# Patient Record
Sex: FEMALE | Race: WHITE | NOT HISPANIC OR LATINO | URBAN - METROPOLITAN AREA
[De-identification: names, ages, dates, MRNs, and addresses within clinical notes are randomized per-mention and may not be internally consistent; named-entity substitution may affect disease eponyms.]

---

## 2017-08-14 ENCOUNTER — EMERGENCY (EMERGENCY)
Facility: HOSPITAL | Age: 82
LOS: 0 days | Discharge: HOME | End: 2017-08-14
Attending: EMERGENCY MEDICINE | Admitting: EMERGENCY MEDICINE
Payer: MEDICARE

## 2017-08-14 VITALS
HEART RATE: 74 BPM | RESPIRATION RATE: 17 BRPM | DIASTOLIC BLOOD PRESSURE: 84 MMHG | OXYGEN SATURATION: 100 % | SYSTOLIC BLOOD PRESSURE: 195 MMHG

## 2017-08-14 VITALS
TEMPERATURE: 98 F | SYSTOLIC BLOOD PRESSURE: 194 MMHG | DIASTOLIC BLOOD PRESSURE: 82 MMHG | RESPIRATION RATE: 16 BRPM | OXYGEN SATURATION: 98 % | WEIGHT: 134.92 LBS | HEIGHT: 67 IN | HEART RATE: 76 BPM

## 2017-08-14 DIAGNOSIS — R04.0 EPISTAXIS: ICD-10-CM

## 2017-08-14 DIAGNOSIS — I48.91 UNSPECIFIED ATRIAL FIBRILLATION: ICD-10-CM

## 2017-08-14 DIAGNOSIS — G20 PARKINSON'S DISEASE: ICD-10-CM

## 2017-08-14 DIAGNOSIS — Z79.01 LONG TERM (CURRENT) USE OF ANTICOAGULANTS: ICD-10-CM

## 2017-08-14 LAB
BASOPHILS # BLD AUTO: 0.1 K/UL — SIGNIFICANT CHANGE UP (ref 0–0.2)
BASOPHILS NFR BLD AUTO: 0.9 % — SIGNIFICANT CHANGE UP (ref 0–2)
EOSINOPHIL # BLD AUTO: 0.1 K/UL — SIGNIFICANT CHANGE UP (ref 0–0.5)
EOSINOPHIL NFR BLD AUTO: 1.5 % — SIGNIFICANT CHANGE UP (ref 0–6)
HCT VFR BLD CALC: 37.2 % — SIGNIFICANT CHANGE UP (ref 34.5–45)
HGB BLD-MCNC: 13.3 G/DL — SIGNIFICANT CHANGE UP (ref 11.5–15.5)
INR BLD: 2.34 RATIO — HIGH (ref 0.88–1.16)
LYMPHOCYTES # BLD AUTO: 1.4 K/UL — SIGNIFICANT CHANGE UP (ref 1–3.3)
LYMPHOCYTES # BLD AUTO: 15.9 % — SIGNIFICANT CHANGE UP (ref 13–44)
MCHC RBC-ENTMCNC: 32.4 PG — SIGNIFICANT CHANGE UP (ref 27–34)
MCHC RBC-ENTMCNC: 35.8 GM/DL — SIGNIFICANT CHANGE UP (ref 32–36)
MCV RBC AUTO: 90.6 FL — SIGNIFICANT CHANGE UP (ref 80–100)
MONOCYTES # BLD AUTO: 0.5 K/UL — SIGNIFICANT CHANGE UP (ref 0–0.9)
MONOCYTES NFR BLD AUTO: 6.4 % — SIGNIFICANT CHANGE UP (ref 2–14)
NEUTROPHILS # BLD AUTO: 6.4 K/UL — SIGNIFICANT CHANGE UP (ref 1.8–7.4)
NEUTROPHILS NFR BLD AUTO: 75.3 % — SIGNIFICANT CHANGE UP (ref 43–77)
PLATELET # BLD AUTO: 226 K/UL — SIGNIFICANT CHANGE UP (ref 150–400)
PROTHROM AB SERPL-ACNC: 25.7 SEC — HIGH (ref 9.8–12.7)
RBC # BLD: 4.11 M/UL — SIGNIFICANT CHANGE UP (ref 3.8–5.2)
RBC # FLD: 12.2 % — SIGNIFICANT CHANGE UP (ref 10.3–14.5)
WBC # BLD: 8.5 K/UL — SIGNIFICANT CHANGE UP (ref 3.8–10.5)
WBC # FLD AUTO: 8.5 K/UL — SIGNIFICANT CHANGE UP (ref 3.8–10.5)

## 2017-08-14 PROCEDURE — 99284 EMERGENCY DEPT VISIT MOD MDM: CPT | Mod: 25

## 2017-08-14 PROCEDURE — 30901 CONTROL OF NOSEBLEED: CPT | Mod: RT

## 2017-08-14 NOTE — ED PROVIDER NOTE - OBJECTIVE STATEMENT
86 y/o female with history of parkinsons, atrial fibrillation on coumadin, in with nose bleed x 2 days, twice yesterday, was able to be stopped with pressure, today nose bleed worse on right side x 1 hour, denies any trauma, denies picking nose.   No other symptoms currently, denies hematuria, rectal bleeding.  Last INR a couple weeks ago 2.6.  No change in diet, no change in coumadin dosing.

## 2017-08-14 NOTE — ED ADULT NURSE NOTE - OBJECTIVE STATEMENT
presents to the ED with spontaneous nose bleed. states that it began last night and then stopped for a while and stated this evening again around 5pm. on coumadin at home.

## 2017-08-14 NOTE — ED PROCEDURE NOTE - CPROC ED NASAL DETAIL1
The bleeding stopped./The source of the bleeding was clearly identified./The area was cauterized with silver nitrate.

## 2017-08-14 NOTE — ED PROVIDER NOTE - PROGRESS NOTE DETAILS
attending note: I evaluated patient, agree with resident assessment and plan. No active bleeding 20 minutes post cautery, oropharynx clear.

## 2018-05-24 ENCOUNTER — INPATIENT (INPATIENT)
Facility: HOSPITAL | Age: 83
LOS: 4 days | Discharge: NURSING, REHAB OR LONG-TERM CARE | End: 2018-05-29
Attending: FAMILY MEDICINE | Admitting: FAMILY MEDICINE
Payer: MEDICARE

## 2018-05-24 VITALS
TEMPERATURE: 98 F | RESPIRATION RATE: 18 BRPM | OXYGEN SATURATION: 100 % | WEIGHT: 125 LBS | HEART RATE: 83 BPM | SYSTOLIC BLOOD PRESSURE: 222 MMHG | DIASTOLIC BLOOD PRESSURE: 102 MMHG

## 2018-05-24 LAB
ALBUMIN SERPL ELPH-MCNC: 3.7 G/DL — SIGNIFICANT CHANGE UP (ref 3.3–5)
ALP SERPL-CCNC: 93 U/L — SIGNIFICANT CHANGE UP (ref 40–120)
ALT FLD-CCNC: 9 U/L — LOW (ref 12–78)
ANION GAP SERPL CALC-SCNC: 7 MMOL/L — SIGNIFICANT CHANGE UP (ref 5–17)
APPEARANCE UR: CLEAR — SIGNIFICANT CHANGE UP
APTT BLD: 36 SEC — SIGNIFICANT CHANGE UP (ref 27.5–37.4)
AST SERPL-CCNC: 25 U/L — SIGNIFICANT CHANGE UP (ref 15–37)
BACTERIA # UR AUTO: (no result)
BASOPHILS # BLD AUTO: 0.08 K/UL — SIGNIFICANT CHANGE UP (ref 0–0.2)
BASOPHILS NFR BLD AUTO: 0.7 % — SIGNIFICANT CHANGE UP (ref 0–2)
BILIRUB SERPL-MCNC: 0.9 MG/DL — SIGNIFICANT CHANGE UP (ref 0.2–1.2)
BILIRUB UR-MCNC: NEGATIVE — SIGNIFICANT CHANGE UP
BLD GP AB SCN SERPL QL: SIGNIFICANT CHANGE UP
BUN SERPL-MCNC: 17 MG/DL — SIGNIFICANT CHANGE UP (ref 7–23)
CALCIUM SERPL-MCNC: 9.1 MG/DL — SIGNIFICANT CHANGE UP (ref 8.5–10.1)
CHLORIDE SERPL-SCNC: 99 MMOL/L — SIGNIFICANT CHANGE UP (ref 96–108)
CK SERPL-CCNC: 349 U/L — HIGH (ref 26–192)
CO2 SERPL-SCNC: 25 MMOL/L — SIGNIFICANT CHANGE UP (ref 22–31)
COLOR SPEC: YELLOW — SIGNIFICANT CHANGE UP
CREAT SERPL-MCNC: 0.95 MG/DL — SIGNIFICANT CHANGE UP (ref 0.5–1.3)
DIFF PNL FLD: (no result)
EOSINOPHIL # BLD AUTO: 0.08 K/UL — SIGNIFICANT CHANGE UP (ref 0–0.5)
EOSINOPHIL NFR BLD AUTO: 0.7 % — SIGNIFICANT CHANGE UP (ref 0–6)
EPI CELLS # UR: SIGNIFICANT CHANGE UP
GLUCOSE SERPL-MCNC: 101 MG/DL — HIGH (ref 70–99)
GLUCOSE UR QL: NEGATIVE MG/DL — SIGNIFICANT CHANGE UP
HCT VFR BLD CALC: 40.6 % — SIGNIFICANT CHANGE UP (ref 34.5–45)
HGB BLD-MCNC: 14.2 G/DL — SIGNIFICANT CHANGE UP (ref 11.5–15.5)
IMM GRANULOCYTES NFR BLD AUTO: 0.8 % — SIGNIFICANT CHANGE UP (ref 0–1.5)
INR BLD: 1.98 RATIO — HIGH (ref 0.88–1.16)
KETONES UR-MCNC: (no result)
LEUKOCYTE ESTERASE UR-ACNC: (no result)
LYMPHOCYTES # BLD AUTO: 1.05 K/UL — SIGNIFICANT CHANGE UP (ref 1–3.3)
LYMPHOCYTES # BLD AUTO: 9 % — LOW (ref 13–44)
MCHC RBC-ENTMCNC: 31.3 PG — SIGNIFICANT CHANGE UP (ref 27–34)
MCHC RBC-ENTMCNC: 35 GM/DL — SIGNIFICANT CHANGE UP (ref 32–36)
MCV RBC AUTO: 89.4 FL — SIGNIFICANT CHANGE UP (ref 80–100)
MONOCYTES # BLD AUTO: 0.8 K/UL — SIGNIFICANT CHANGE UP (ref 0–0.9)
MONOCYTES NFR BLD AUTO: 6.9 % — SIGNIFICANT CHANGE UP (ref 2–14)
NEUTROPHILS # BLD AUTO: 9.53 K/UL — HIGH (ref 1.8–7.4)
NEUTROPHILS NFR BLD AUTO: 81.9 % — HIGH (ref 43–77)
NITRITE UR-MCNC: NEGATIVE — SIGNIFICANT CHANGE UP
NRBC # BLD: 0 /100 WBCS — SIGNIFICANT CHANGE UP (ref 0–0)
PH UR: 6 — SIGNIFICANT CHANGE UP (ref 5–8)
PLATELET # BLD AUTO: 196 K/UL — SIGNIFICANT CHANGE UP (ref 150–400)
POTASSIUM SERPL-MCNC: 3.7 MMOL/L — SIGNIFICANT CHANGE UP (ref 3.5–5.3)
POTASSIUM SERPL-SCNC: 3.7 MMOL/L — SIGNIFICANT CHANGE UP (ref 3.5–5.3)
PROT SERPL-MCNC: 7.7 GM/DL — SIGNIFICANT CHANGE UP (ref 6–8.3)
PROT UR-MCNC: 15 MG/DL
PROTHROM AB SERPL-ACNC: 21.7 SEC — HIGH (ref 9.8–12.7)
RBC # BLD: 4.54 M/UL — SIGNIFICANT CHANGE UP (ref 3.8–5.2)
RBC # FLD: 13 % — SIGNIFICANT CHANGE UP (ref 10.3–14.5)
RBC CASTS # UR COMP ASSIST: (no result) /HPF (ref 0–4)
SODIUM SERPL-SCNC: 131 MMOL/L — LOW (ref 135–145)
SP GR SPEC: 1.01 — SIGNIFICANT CHANGE UP (ref 1.01–1.02)
TROPONIN I SERPL-MCNC: <0.015 NG/ML — SIGNIFICANT CHANGE UP (ref 0.01–0.04)
TYPE + AB SCN PNL BLD: SIGNIFICANT CHANGE UP
UROBILINOGEN FLD QL: NEGATIVE MG/DL — SIGNIFICANT CHANGE UP
WBC # BLD: 11.63 K/UL — HIGH (ref 3.8–10.5)
WBC # FLD AUTO: 11.63 K/UL — HIGH (ref 3.8–10.5)
WBC UR QL: (no result)

## 2018-05-24 PROCEDURE — 72125 CT NECK SPINE W/O DYE: CPT | Mod: 26

## 2018-05-24 PROCEDURE — 71250 CT THORAX DX C-: CPT | Mod: 26

## 2018-05-24 PROCEDURE — 93010 ELECTROCARDIOGRAM REPORT: CPT

## 2018-05-24 PROCEDURE — 74176 CT ABD & PELVIS W/O CONTRAST: CPT | Mod: 26

## 2018-05-24 PROCEDURE — 71045 X-RAY EXAM CHEST 1 VIEW: CPT | Mod: 26

## 2018-05-24 PROCEDURE — 73502 X-RAY EXAM HIP UNI 2-3 VIEWS: CPT | Mod: 26

## 2018-05-24 PROCEDURE — 99285 EMERGENCY DEPT VISIT HI MDM: CPT

## 2018-05-24 PROCEDURE — 70450 CT HEAD/BRAIN W/O DYE: CPT | Mod: 26

## 2018-05-24 RX ORDER — CEFTRIAXONE 500 MG/1
1000 INJECTION, POWDER, FOR SOLUTION INTRAMUSCULAR; INTRAVENOUS ONCE
Qty: 0 | Refills: 0 | Status: COMPLETED | OUTPATIENT
Start: 2018-05-24 | End: 2018-05-25

## 2018-05-24 RX ORDER — CARBIDOPA AND LEVODOPA 25; 100 MG/1; MG/1
0.5 TABLET ORAL
Qty: 0 | Refills: 0 | Status: DISCONTINUED | OUTPATIENT
Start: 2018-05-24 | End: 2018-05-29

## 2018-05-24 RX ORDER — AMANTADINE HCL 100 MG
100 CAPSULE ORAL
Qty: 0 | Refills: 0 | Status: DISCONTINUED | OUTPATIENT
Start: 2018-05-24 | End: 2018-05-29

## 2018-05-24 RX ORDER — ACETAMINOPHEN 500 MG
1000 TABLET ORAL ONCE
Qty: 0 | Refills: 0 | Status: COMPLETED | OUTPATIENT
Start: 2018-05-24 | End: 2018-05-24

## 2018-05-24 RX ORDER — CARBIDOPA AND LEVODOPA 25; 100 MG/1; MG/1
1 TABLET ORAL
Qty: 0 | Refills: 0 | COMMUNITY

## 2018-05-24 RX ADMIN — Medication 400 MILLIGRAM(S): at 21:00

## 2018-05-24 NOTE — ED PROVIDER NOTE - OBJECTIVE STATEMENT
86 y/o female with a PMHx of Parkinson's, and Afib on coumadin (levels last checked last week, 2.2) presents to the ED s/p fall down steps today. Pt states she was going down the stairs, but before she reached the bottom of the stairs she felt herself blacking out. Pt knew she was going to fall so she tried to brace herself on the railing. Fell and hit her head on door, landed on R hip. Due to pt's h/o Parkinson's, she states she feels dizzy and lightheaded at baseline and felt dizzy before falling. At time of eval, pt c/o pain to head and slight, receding pain to R hip. Pt's daughter at bedside says pt has a h/o syncopizing and falling, typically onto her bed. No other acute complaints at time of eval. 86 y/o female with a PMHx of Parkinson's, and Afib on coumadin (levels last checked last week, 2.2) presents to the ED s/p syncope and fall down steps today. Pt states she was going down the stairs, but before she reached the bottom of the stairs she felt herself blacking out. Pt knew she was going to fall so she tried to brace herself on the railing. Fell and hit her head on door, landed on R hip. Due to pt's h/o Parkinson's, she states she feels dizzy and lightheaded at baseline and felt dizzy before falling. At time of eval, pt c/o pain to head and slight, receding pain to R hip. Pt's daughter at bedside says pt has a h/o syncopizing and falling, typically onto her bed. No other acute complaints at time of eval. 88 y/o female with a PMHx of Parkinson's, and Afib on coumadin (levels last checked last week, 2.2) presents to the ED s/p syncope and fall down steps today. Pt states she was going down the stairs, but before she reached the bottom of the stairs she felt herself blacking out. Pt knew she was going to fall so she tried to brace herself on the railing. Fell and hit her head on door, landed on R hip. Due to pt's h/o Parkinson's, she states she feels dizzy and lightheaded at baseline and felt dizzy before falling. At time of eval, pt c/o pain to head and slight, pain to R hip. Pt's daughter at bedside says pt has a h/o syncopizing and falling, typically onto her bed. No other acute complaints at time of eval.

## 2018-05-24 NOTE — ED PROVIDER NOTE - MEDICAL DECISION MAKING DETAILS
Travis MORALES for ED attending, Dr. Reid: 88 y/o F s/p syncope with closed head injury. Plan for EKG, CXR, labs, x-ray pelvis, CT scans, admit.

## 2018-05-24 NOTE — ED PROVIDER NOTE - SKIN, MLM
Skin normal color for race, warm, dry. No evidence of rash. +Hematoma to R forehead. +Skin tear to web space of 1st and 2nd digit of left hand.

## 2018-05-24 NOTE — ED PROVIDER NOTE - PROGRESS NOTE DETAILS
Jeanette DO: Patient to be admitted s/p syncope; endorsed to hospitalist; found to have UTI- will treat; family at bedside- aware of all lab, CT scans results- agreeable with plan at this time.

## 2018-05-25 LAB
ANION GAP SERPL CALC-SCNC: 7 MMOL/L — SIGNIFICANT CHANGE UP (ref 5–17)
BASOPHILS # BLD AUTO: 0.05 K/UL — SIGNIFICANT CHANGE UP (ref 0–0.2)
BASOPHILS NFR BLD AUTO: 0.6 % — SIGNIFICANT CHANGE UP (ref 0–2)
BUN SERPL-MCNC: 16 MG/DL — SIGNIFICANT CHANGE UP (ref 7–23)
CALCIUM SERPL-MCNC: 8.8 MG/DL — SIGNIFICANT CHANGE UP (ref 8.5–10.1)
CHLORIDE SERPL-SCNC: 102 MMOL/L — SIGNIFICANT CHANGE UP (ref 96–108)
CO2 SERPL-SCNC: 25 MMOL/L — SIGNIFICANT CHANGE UP (ref 22–31)
CREAT SERPL-MCNC: 0.88 MG/DL — SIGNIFICANT CHANGE UP (ref 0.5–1.3)
EOSINOPHIL # BLD AUTO: 0.05 K/UL — SIGNIFICANT CHANGE UP (ref 0–0.5)
EOSINOPHIL NFR BLD AUTO: 0.6 % — SIGNIFICANT CHANGE UP (ref 0–6)
GLUCOSE SERPL-MCNC: 111 MG/DL — HIGH (ref 70–99)
HCT VFR BLD CALC: 38.1 % — SIGNIFICANT CHANGE UP (ref 34.5–45)
HGB BLD-MCNC: 13.4 G/DL — SIGNIFICANT CHANGE UP (ref 11.5–15.5)
IMM GRANULOCYTES NFR BLD AUTO: 0.5 % — SIGNIFICANT CHANGE UP (ref 0–1.5)
INR BLD: 2.27 RATIO — HIGH (ref 0.88–1.16)
LYMPHOCYTES # BLD AUTO: 0.98 K/UL — LOW (ref 1–3.3)
LYMPHOCYTES # BLD AUTO: 11.7 % — LOW (ref 13–44)
MCHC RBC-ENTMCNC: 31.5 PG — SIGNIFICANT CHANGE UP (ref 27–34)
MCHC RBC-ENTMCNC: 35.2 GM/DL — SIGNIFICANT CHANGE UP (ref 32–36)
MCV RBC AUTO: 89.6 FL — SIGNIFICANT CHANGE UP (ref 80–100)
MONOCYTES # BLD AUTO: 0.68 K/UL — SIGNIFICANT CHANGE UP (ref 0–0.9)
MONOCYTES NFR BLD AUTO: 8.1 % — SIGNIFICANT CHANGE UP (ref 2–14)
NEUTROPHILS # BLD AUTO: 6.57 K/UL — SIGNIFICANT CHANGE UP (ref 1.8–7.4)
NEUTROPHILS NFR BLD AUTO: 78.5 % — HIGH (ref 43–77)
NRBC # BLD: 0 /100 WBCS — SIGNIFICANT CHANGE UP (ref 0–0)
PLATELET # BLD AUTO: 183 K/UL — SIGNIFICANT CHANGE UP (ref 150–400)
POTASSIUM SERPL-MCNC: 3.2 MMOL/L — LOW (ref 3.5–5.3)
POTASSIUM SERPL-SCNC: 3.2 MMOL/L — LOW (ref 3.5–5.3)
PROTHROM AB SERPL-ACNC: 24.9 SEC — HIGH (ref 9.8–12.7)
RBC # BLD: 4.25 M/UL — SIGNIFICANT CHANGE UP (ref 3.8–5.2)
RBC # FLD: 13.1 % — SIGNIFICANT CHANGE UP (ref 10.3–14.5)
SODIUM SERPL-SCNC: 134 MMOL/L — LOW (ref 135–145)
TROPONIN I SERPL-MCNC: <0.015 NG/ML — SIGNIFICANT CHANGE UP (ref 0.01–0.04)
TROPONIN I SERPL-MCNC: <0.015 NG/ML — SIGNIFICANT CHANGE UP (ref 0.01–0.04)
WBC # BLD: 8.37 K/UL — SIGNIFICANT CHANGE UP (ref 3.8–10.5)
WBC # FLD AUTO: 8.37 K/UL — SIGNIFICANT CHANGE UP (ref 3.8–10.5)

## 2018-05-25 PROCEDURE — 93306 TTE W/DOPPLER COMPLETE: CPT | Mod: 26

## 2018-05-25 PROCEDURE — 93880 EXTRACRANIAL BILAT STUDY: CPT | Mod: 26

## 2018-05-25 RX ORDER — LEVOTHYROXINE SODIUM 125 MCG
100 TABLET ORAL DAILY
Qty: 0 | Refills: 0 | Status: DISCONTINUED | OUTPATIENT
Start: 2018-05-25 | End: 2018-05-29

## 2018-05-25 RX ORDER — LATANOPROST 0.05 MG/ML
1 SOLUTION/ DROPS OPHTHALMIC; TOPICAL
Qty: 0 | Refills: 0 | COMMUNITY

## 2018-05-25 RX ORDER — WARFARIN SODIUM 2.5 MG/1
1 TABLET ORAL DAILY
Qty: 0 | Refills: 0 | Status: DISCONTINUED | OUTPATIENT
Start: 2018-05-25 | End: 2018-05-26

## 2018-05-25 RX ORDER — CEFTRIAXONE 500 MG/1
1000 INJECTION, POWDER, FOR SOLUTION INTRAMUSCULAR; INTRAVENOUS EVERY 24 HOURS
Qty: 0 | Refills: 0 | Status: DISCONTINUED | OUTPATIENT
Start: 2018-05-25 | End: 2018-05-27

## 2018-05-25 RX ORDER — METOPROLOL TARTRATE 50 MG
0.5 TABLET ORAL
Qty: 0 | Refills: 0 | COMMUNITY

## 2018-05-25 RX ORDER — LATANOPROST 0.05 MG/ML
1 SOLUTION/ DROPS OPHTHALMIC; TOPICAL AT BEDTIME
Qty: 0 | Refills: 0 | Status: DISCONTINUED | OUTPATIENT
Start: 2018-05-25 | End: 2018-05-29

## 2018-05-25 RX ORDER — FLUOXETINE HCL 10 MG
20 CAPSULE ORAL DAILY
Qty: 0 | Refills: 0 | Status: DISCONTINUED | OUTPATIENT
Start: 2018-05-25 | End: 2018-05-29

## 2018-05-25 RX ORDER — LEVOTHYROXINE SODIUM 125 MCG
1 TABLET ORAL
Qty: 0 | Refills: 0 | COMMUNITY

## 2018-05-25 RX ORDER — CARBIDOPA AND LEVODOPA 25; 100 MG/1; MG/1
0.5 TABLET ORAL
Qty: 0 | Refills: 0 | COMMUNITY

## 2018-05-25 RX ORDER — RASAGILINE 0.5 MG/1
0.5 TABLET ORAL DAILY
Qty: 0 | Refills: 0 | Status: DISCONTINUED | OUTPATIENT
Start: 2018-05-25 | End: 2018-05-29

## 2018-05-25 RX ORDER — ACETAMINOPHEN 500 MG
650 TABLET ORAL EVERY 8 HOURS
Qty: 0 | Refills: 0 | Status: DISCONTINUED | OUTPATIENT
Start: 2018-05-25 | End: 2018-05-29

## 2018-05-25 RX ORDER — FLUOXETINE HCL 10 MG
1 CAPSULE ORAL
Qty: 0 | Refills: 0 | COMMUNITY

## 2018-05-25 RX ORDER — ATORVASTATIN CALCIUM 80 MG/1
1 TABLET, FILM COATED ORAL
Qty: 0 | Refills: 0 | COMMUNITY

## 2018-05-25 RX ORDER — AMANTADINE HCL 100 MG
1 CAPSULE ORAL
Qty: 0 | Refills: 0 | COMMUNITY

## 2018-05-25 RX ORDER — METOPROLOL TARTRATE 50 MG
12.5 TABLET ORAL AT BEDTIME
Qty: 0 | Refills: 0 | Status: DISCONTINUED | OUTPATIENT
Start: 2018-05-25 | End: 2018-05-29

## 2018-05-25 RX ORDER — RASAGILINE 0.5 MG/1
1 TABLET ORAL
Qty: 0 | Refills: 0 | COMMUNITY

## 2018-05-25 RX ORDER — ATORVASTATIN CALCIUM 80 MG/1
20 TABLET, FILM COATED ORAL AT BEDTIME
Qty: 0 | Refills: 0 | Status: DISCONTINUED | OUTPATIENT
Start: 2018-05-25 | End: 2018-05-29

## 2018-05-25 RX ADMIN — Medication 20 MILLIGRAM(S): at 09:08

## 2018-05-25 RX ADMIN — RASAGILINE 0.5 MILLIGRAM(S): 0.5 TABLET ORAL at 14:37

## 2018-05-25 RX ADMIN — CEFTRIAXONE 1000 MILLIGRAM(S): 500 INJECTION, POWDER, FOR SOLUTION INTRAMUSCULAR; INTRAVENOUS at 12:50

## 2018-05-25 RX ADMIN — CARBIDOPA AND LEVODOPA 0.5 TABLET(S): 25; 100 TABLET ORAL at 12:50

## 2018-05-25 RX ADMIN — CARBIDOPA AND LEVODOPA 0.5 TABLET(S): 25; 100 TABLET ORAL at 09:08

## 2018-05-25 RX ADMIN — Medication 12.5 MILLIGRAM(S): at 21:40

## 2018-05-25 RX ADMIN — Medication 100 MICROGRAM(S): at 04:33

## 2018-05-25 RX ADMIN — Medication 100 MILLIGRAM(S): at 09:08

## 2018-05-25 RX ADMIN — LATANOPROST 1 DROP(S): 0.05 SOLUTION/ DROPS OPHTHALMIC; TOPICAL at 21:41

## 2018-05-25 RX ADMIN — CEFTRIAXONE 1000 MILLIGRAM(S): 500 INJECTION, POWDER, FOR SOLUTION INTRAMUSCULAR; INTRAVENOUS at 03:08

## 2018-05-25 RX ADMIN — ATORVASTATIN CALCIUM 20 MILLIGRAM(S): 80 TABLET, FILM COATED ORAL at 21:40

## 2018-05-25 RX ADMIN — CARBIDOPA AND LEVODOPA 0.5 TABLET(S): 25; 100 TABLET ORAL at 17:11

## 2018-05-25 RX ADMIN — Medication 100 MILLIGRAM(S): at 12:50

## 2018-05-25 RX ADMIN — WARFARIN SODIUM 1 MILLIGRAM(S): 2.5 TABLET ORAL at 21:41

## 2018-05-25 NOTE — PHYSICAL THERAPY INITIAL EVALUATION ADULT - LIVES WITH, PROFILE
children/lives w/, daughter and niece in 2 story home, 3 YOMI w/rail, able to stay on ground floor/other relative/spouse

## 2018-05-25 NOTE — PHYSICAL THERAPY INITIAL EVALUATION ADULT - PERTINENT HX OF CURRENT PROBLEM, REHAB EVAL
Pt c/o of syncope and fall, when going down the stairs, but before she reached the bottom of the stairs she felt herself blacking out. Pt knew she was going to fall so she tried to brace herself on the railing.  CT c-spine (+) Mod reversal to the normal cervical lordosis, mod/severe spondylosis. CTH (+) large R frontal scalp hematoma.XRC (+) pleural thickening w/ calcification Pt c/o of syncope and fall. Before she reached the bottom of the stairs she felt herself blacking out. Pt anticipated the fall, tried to brace herself on railing, hit head on door, landed on R hip. Dizziness at baseline. CT c-spine (+) Mod reversal to the normal cervical lordosis, mod/severe spondylosis. CTH (+) large R frontal scalp hematoma.XRC (+) pleural thickening w/ calcification.XRH (-). Pt c/o of syncope and fall. At home, before she reached the bottom of the stairs she felt herself blacking out. Pt anticipated the fall, tried to brace herself on railing, hit head on door, landed on R hip. Dizziness at baseline. CT c-spine (+) Mod reversal to the normal cervical lordosis, mod/severe spondylosis. CTH (+) large R frontal scalp hematoma. CXR (+) pleural thickening w/ calcification. XR hip (-).

## 2018-05-25 NOTE — PHYSICAL THERAPY INITIAL EVALUATION ADULT - GENERAL OBSERVATIONS, REHAB EVAL
Pt was received on CICU, alert and in NAD. Pt was received sitting in BS chair in CICU, alert and in NAD.

## 2018-05-25 NOTE — PHYSICAL THERAPY INITIAL EVALUATION ADULT - ADDITIONAL COMMENTS
Pt states she was using a RW to amb around the house but then stopped using it because she felt more steady. Pt needs assistance w/meals and groceries.

## 2018-05-25 NOTE — H&P ADULT - HISTORY OF PRESENT ILLNESS
88 y/o female with a PMHx of Parkinson's, and Afib on coumadin (levels last checked last week, 2.2) presents to the ED s/p syncope and fall down steps today. Pt states she was going down the stairs, but before she reached the bottom of the stairs she felt herself blacking out. Pt knew she was going to fall so she tried to brace herself on the railing. Fell and hit her head on door, landed on R hip. Due to pt's h/o Parkinson's, she states she feels dizzy and lightheaded at baseline and felt dizzy before falling. At time of eval, pt c/o pain to head and slight, pain to R hip. No other acute complaints at time of eval.

## 2018-05-25 NOTE — H&P ADULT - NSHPPHYSICALEXAM_GEN_ALL_CORE
Vital Signs Last 24 Hrs  T(C): 36.7 (24 May 2018 18:51), Max: 36.7 (24 May 2018 18:51)  T(F): 98 (24 May 2018 18:51), Max: 98 (24 May 2018 18:51)  HR: 80 (24 May 2018 21:30) (80 - 83)  BP: 177/76 (24 May 2018 21:30) (177/76 - 222/102)  RR: 20 (24 May 2018 21:30) (18 - 20)  SpO2: 100% (24 May 2018 21:30) (100% - 100%)

## 2018-05-25 NOTE — PHYSICAL THERAPY INITIAL EVALUATION ADULT - LEVEL OF INDEPENDENCE: SIT/STAND, REHAB EVAL
contact guard/minimum assist (75% patients effort)/from toilet min assist, from chair CG contact guard/minimum assist (75% patients effort)/from toilet w/ min assist, from chair CG

## 2018-05-25 NOTE — H&P ADULT - ASSESSMENT
88 yo female presented after syncope.    A/P:    1.  Syncope  s/p Fall  Scalp hematoma  -will follow clinically in telemetry  -follow cardiology consult  -will follow PT eval    2.  Parkinson's disease   -continue Home medications    3.  HTN  -follow BP and adjust medication as needed    4.   Hypothyroidism  -will continue levothyroxine     5.  UTI  -started on ceftriaxone  -follow urine cx

## 2018-05-25 NOTE — H&P ADULT - NEUROLOGICAL DETAILS
normal strength/sensation intact/deep reflexes intact/cranial nerves intact/alert and oriented x 3/responds to pain

## 2018-05-26 LAB
BASOPHILS # BLD AUTO: 0.08 K/UL — SIGNIFICANT CHANGE UP (ref 0–0.2)
BASOPHILS NFR BLD AUTO: 1.1 % — SIGNIFICANT CHANGE UP (ref 0–2)
CULTURE RESULTS: NO GROWTH — SIGNIFICANT CHANGE UP
EOSINOPHIL # BLD AUTO: 0.13 K/UL — SIGNIFICANT CHANGE UP (ref 0–0.5)
EOSINOPHIL NFR BLD AUTO: 1.7 % — SIGNIFICANT CHANGE UP (ref 0–6)
GLUCOSE BLDC GLUCOMTR-MCNC: 100 MG/DL — HIGH (ref 70–99)
HCT VFR BLD CALC: 38.1 % — SIGNIFICANT CHANGE UP (ref 34.5–45)
HGB BLD-MCNC: 13.1 G/DL — SIGNIFICANT CHANGE UP (ref 11.5–15.5)
IMM GRANULOCYTES NFR BLD AUTO: 0.3 % — SIGNIFICANT CHANGE UP (ref 0–1.5)
INR BLD: 1.69 RATIO — HIGH (ref 0.88–1.16)
INR BLD: 1.74 RATIO — HIGH (ref 0.88–1.16)
LYMPHOCYTES # BLD AUTO: 1.24 K/UL — SIGNIFICANT CHANGE UP (ref 1–3.3)
LYMPHOCYTES # BLD AUTO: 16.5 % — SIGNIFICANT CHANGE UP (ref 13–44)
MCHC RBC-ENTMCNC: 31.2 PG — SIGNIFICANT CHANGE UP (ref 27–34)
MCHC RBC-ENTMCNC: 34.4 GM/DL — SIGNIFICANT CHANGE UP (ref 32–36)
MCV RBC AUTO: 90.7 FL — SIGNIFICANT CHANGE UP (ref 80–100)
MONOCYTES # BLD AUTO: 0.6 K/UL — SIGNIFICANT CHANGE UP (ref 0–0.9)
MONOCYTES NFR BLD AUTO: 8 % — SIGNIFICANT CHANGE UP (ref 2–14)
NEUTROPHILS # BLD AUTO: 5.46 K/UL — SIGNIFICANT CHANGE UP (ref 1.8–7.4)
NEUTROPHILS NFR BLD AUTO: 72.4 % — SIGNIFICANT CHANGE UP (ref 43–77)
NRBC # BLD: 0 /100 WBCS — SIGNIFICANT CHANGE UP (ref 0–0)
PLATELET # BLD AUTO: 197 K/UL — SIGNIFICANT CHANGE UP (ref 150–400)
PROTHROM AB SERPL-ACNC: 18.4 SEC — HIGH (ref 9.8–12.7)
PROTHROM AB SERPL-ACNC: 19 SEC — HIGH (ref 9.8–12.7)
RBC # BLD: 4.2 M/UL — SIGNIFICANT CHANGE UP (ref 3.8–5.2)
RBC # FLD: 13.2 % — SIGNIFICANT CHANGE UP (ref 10.3–14.5)
SPECIMEN SOURCE: SIGNIFICANT CHANGE UP
WBC # BLD: 7.53 K/UL — SIGNIFICANT CHANGE UP (ref 3.8–10.5)
WBC # FLD AUTO: 7.53 K/UL — SIGNIFICANT CHANGE UP (ref 3.8–10.5)

## 2018-05-26 PROCEDURE — 70496 CT ANGIOGRAPHY HEAD: CPT | Mod: 26

## 2018-05-26 PROCEDURE — 70450 CT HEAD/BRAIN W/O DYE: CPT | Mod: 26,59

## 2018-05-26 RX ORDER — PROTHROMBIN COMPLEX CONCENTRATE (HUMAN) 25.5; 16.5; 24; 22; 22; 26 [IU]/ML; [IU]/ML; [IU]/ML; [IU]/ML; [IU]/ML; [IU]/ML
1500 POWDER, FOR SOLUTION INTRAVENOUS ONCE
Qty: 0 | Refills: 0 | Status: COMPLETED | OUTPATIENT
Start: 2018-05-26 | End: 2018-05-26

## 2018-05-26 RX ADMIN — PROTHROMBIN COMPLEX CONCENTRATE (HUMAN) 400 INTERNATIONAL UNIT(S): 25.5; 16.5; 24; 22; 22; 26 POWDER, FOR SOLUTION INTRAVENOUS at 12:17

## 2018-05-26 RX ADMIN — CARBIDOPA AND LEVODOPA 0.5 TABLET(S): 25; 100 TABLET ORAL at 10:03

## 2018-05-26 RX ADMIN — Medication 100 MICROGRAM(S): at 05:53

## 2018-05-26 RX ADMIN — Medication 100 MILLIGRAM(S): at 14:12

## 2018-05-26 RX ADMIN — CEFTRIAXONE 1000 MILLIGRAM(S): 500 INJECTION, POWDER, FOR SOLUTION INTRAMUSCULAR; INTRAVENOUS at 14:16

## 2018-05-26 RX ADMIN — LATANOPROST 1 DROP(S): 0.05 SOLUTION/ DROPS OPHTHALMIC; TOPICAL at 21:35

## 2018-05-26 RX ADMIN — Medication 12.5 MILLIGRAM(S): at 21:35

## 2018-05-26 RX ADMIN — Medication 100 MILLIGRAM(S): at 10:03

## 2018-05-26 RX ADMIN — CARBIDOPA AND LEVODOPA 0.5 TABLET(S): 25; 100 TABLET ORAL at 14:12

## 2018-05-26 RX ADMIN — ATORVASTATIN CALCIUM 20 MILLIGRAM(S): 80 TABLET, FILM COATED ORAL at 21:35

## 2018-05-26 RX ADMIN — RASAGILINE 0.5 MILLIGRAM(S): 0.5 TABLET ORAL at 14:13

## 2018-05-26 RX ADMIN — Medication 20 MILLIGRAM(S): at 14:13

## 2018-05-26 RX ADMIN — CARBIDOPA AND LEVODOPA 0.5 TABLET(S): 25; 100 TABLET ORAL at 18:12

## 2018-05-26 NOTE — PROGRESS NOTE ADULT - SUBJECTIVE AND OBJECTIVE BOX
CHIEF COMPLAINT: Patient is a 87y old  Female who presents with a chief complaint of syncope and fall (25 May 2018 02:00)      HPI:  88 y/o female with a PMHx of Parkinson's, and Afib on coumadin (levels last checked last week, 2.2) presents to the ED s/p syncope and fall down steps today. Pt states she was going down the stairs, but before she reached the bottom of the stairs she felt herself blacking out. Pt knew she was going to fall so she tried to brace herself on the railing. Fell and hit her head on door, landed on R hip. Due to pt's h/o Parkinson's, she states she feels dizzy and lightheaded at baseline and felt dizzy before falling. At time of eval, pt c/o pain to head and slight, pain to R hip. No other acute complaints at time of eval. (25 May 2018 02:00)      5/26- code stroke called this AM for confusion- finding of ICH        PMHx: PAST MEDICAL & SURGICAL HISTORY:  Parkinson's disease  Afib  No significant past surgical history        Allergies: Allergies    amoxicillin (Unknown)  contrast (Unknown)    Intolerances          REVIEW OF SYSTEMS:    CONSTITUTIONAL: No weakness, fevers or chills  EYES/ENT: No visual changes;  No vertigo or throat pain   NECK: No pain or stiffness  RESPIRATORY: No cough, wheezing, hemoptysis; No shortness of breath  CARDIOVASCULAR: No chest pain or palpitations  GASTROINTESTINAL: No abdominal or epigastric pain. No nausea, vomiting, or hematemesis; No diarrhea or constipation. No melena or hematochezia.  GENITOURINARY: No dysuria, frequency or hematuria  NEUROLOGICAL: No numbness or weakness  SKIN: No itching, burning, rashes, or lesions   All other review of systems is negative unless indicated above    ICU Vital Signs Last 24 Hrs  T(C): 36.4 (26 May 2018 05:40), Max: 36.7 (25 May 2018 23:22)  T(F): 97.6 (26 May 2018 05:40), Max: 98 (25 May 2018 23:22)  HR: 76 (26 May 2018 08:00) (69 - 101)  BP: 161/90 (26 May 2018 08:00) (106/47 - 188/61)  BP(mean): 108 (26 May 2018 08:00) (62 - 119)  ABP: --  ABP(mean): --  RR: 17 (26 May 2018 08:00) (13 - 30)  SpO2: 96% (26 May 2018 08:00) (93% - 99%)          PHYSICAL EXAM:   Constitutional: NAD, awake and alert,  right periorbital ecchymosis  HEENT: PERR, EOMI, Normal Hearing, MMM  Neck: Soft and supple, No LAD, No JVD  Respiratory: Breath sounds are clear bilaterally, No wheezing, rales or rhonchi  Cardiovascular: S1 and S2, regular rate and rhythm, no Murmurs, gallops or rubs  Gastrointestinal: Bowel Sounds present, soft, nontender, nondistended, no guarding, no rebound  Extremities: No peripheral edema  Vascular: 2+ peripheral pulses  Neurological: A/O x 3, no focal deficits  Musculoskeletal: 5/5 strength b/l upper and lower extremities  Skin: No rashes    MEDICATIONS  (STANDING):  amantadine 100 milliGRAM(s) Oral <User Schedule>  atorvastatin 20 milliGRAM(s) Oral at bedtime  carbidopa/levodopa  25/100 0.5 Tablet(s) Oral <User Schedule>  cefTRIAXone Injectable. 1000 milliGRAM(s) IV Push every 24 hours  FLUoxetine 20 milliGRAM(s) Oral daily  latanoprost 0.005% Ophthalmic Solution 1 Drop(s) Both EYES at bedtime  levothyroxine 100 MICROGram(s) Oral daily  metoprolol tartrate 12.5 milliGRAM(s) Oral at bedtime  prothrombin complex concentrate IVPB (KCENTRA) 1500 International Unit(s) IV Intermittent once  rasagiline Tablet 0.5 milliGRAM(s) Oral daily      LABS: All Labs Reviewed:                                13.4   8.37  )-----------( 183      ( 25 May 2018 05:10 )             38.1   05-25    134<L>  |  102  |  16  ----------------------------<  111<H>  3.2<L>   |  25  |  0.88    Ca    8.8      25 May 2018 05:10    TPro  7.7  /  Alb  3.7  /  TBili  0.9  /  DBili  x   /  AST  25  /  ALT  9<L>  /  AlkPhos  93  05-24  PT/INR - ( 26 May 2018 05:19 )   PT: 19.0 sec;   INR: 1.74 ratio         PTT - ( 24 May 2018 20:09 )  PTT:36.0 sec     PTT - ( 24 May 2018 20:09 )  PTT:36.0 sec  CARDIAC MARKERS ( 25 May 2018 05:10 )  <0.015 ng/mL / x     / x     / x     / x      CARDIAC MARKERS ( 25 May 2018 02:31 )  <0.015 ng/mL / x     / x     / x     / x      CARDIAC MARKERS ( 24 May 2018 20:09 )  <0.015 ng/mL / x     / 349 U/L / x     / x          RADIOLOGY: CT head < from: CT Head No Cont (05.24.18 @ 19:33) >  IMPRESSION: Moderate to large right frontal scalp hematoma.  Moderate   chronic microvascular changes without evidence of an acute transcortical   infarction or hemorrhage. MR is a more sensitive imaging modality for the   evaluation of an acute infarction.     EMILIA TSAI   This document has been electronically signed. May 24 2018  7:37PM    < end of copied text >      EKG: SR, RBBB inferior and lateral T wave changes     Telemetry:  SR    ECHO: < from: Transthoracic Echocardiogram (05.25.18 @ 08:37) >   Summary     The left ventricle is normal in size, wall motion and contractility.   Mild concentric left ventricular hypertrophy is present.   Estimated left ventricular ejection fraction is 60-65 %.   Normal aortic valve structure and function.   Trace aortic regurgitation is present.   Normal appearing mitral valve structure and function.   EA reversal of the mitral inflow consistent with reduced compliance of   the   left ventricle.   Mild (1+) mitral regurgitation is present.   Normal appearing tricuspid valve structure and function.   Mild (1+) tricuspid valve regurgitation is present.   Mild pulmonary hypertension.     Signature    < end of copied text >

## 2018-05-26 NOTE — PROGRESS NOTE ADULT - SUBJECTIVE AND OBJECTIVE BOX
86 y/o female with a PMHx of Parkinson's, and Afib on coumadin (levels last checked last week, 2.2) presents to the ED s/p syncope and fall down steps today. Pt states she was going down the stairs, but before she reached the bottom of the stairs she felt herself blacking out. Pt knew she was going to fall so she tried to brace herself on the railing. Fell and hit her head on door, landed on R hip. Due to pt's h/o Parkinson's, she states she feels dizzy and lightheaded at baseline and felt dizzy before falling. At time of eval, pt c/o pain to head and slight, pain to R hip. No other acute complaints at time of eval.      18: Patient seen and examined. S/P code stroke this morning. She had repeat CT head: small SAH. Discussed with 2 daughters at bed side regarding management plan.       Vital Signs Last 24 Hrs  T(C): 36.4 (26 May 2018 05:40), Max: 36.7 (25 May 2018 23:22)  T(F): 97.6 (26 May 2018 05:40), Max: 98 (25 May 2018 23:22)  HR: 76 (26 May 2018 08:00) (69 - 101)  BP: 161/90 (26 May 2018 08:00) (125/64 - 188/61)  BP(mean): 108 (26 May 2018 08:00) (76 - 119)  RR: 17 (26 May 2018 08:00) (13 - 30)  SpO2: 96% (26 May 2018 08:00) (93% - 99%).      Physical exam:       · CARDIAC: Normal rate, regular rhythm.  Heart sounds S1, S2.  No murmurs, rubs or gallops.	  · RESPIRATORY: Breath sounds clear and equal bilaterally.	  · GASTROINTESTINAL: Abdomen soft, non-tender, no guarding.	  · NEUROLOGICAL: - - -	  · Level of Consciousness: alert	  · Speech: clear	  · SKIN: Skin normal color for race, warm, dry and intact. No evidence of rash.	              Labs:                           13.1   7.53  )-----------( 197      ( 26 May 2018 09:32 )             38.1     25 May 2018 05:10    134    |  102    |  16     ----------------------------<  111    3.2     |  25     |  0.88     Ca    8.8        25 May 2018 05:10    TPro  7.7    /  Alb  3.7    /  TBili  0.9    /  DBili  x      /  AST  25     /  ALT  9      /  AlkPhos  93     24 May 2018 20:09    LIVER FUNCTIONS - ( 24 May 2018 20:09 )  Alb: 3.7 g/dL / Pro: 7.7 gm/dL / ALK PHOS: 93 U/L / ALT: 9 U/L / AST: 25 U/L / GGT: x           PT/INR - ( 26 May 2018 09:32 )   PT: 18.4 sec;   INR: 1.69 ratio         PTT - ( 24 May 2018 20:09 )  PTT:36.0 sec  CAPILLARY BLOOD GLUCOSE      POCT Blood Glucose.: 100 mg/dL (26 May 2018 08:08)    CARDIAC MARKERS ( 25 May 2018 05:10 )  <0.015 ng/mL / x     / x     / x     / x      CARDIAC MARKERS ( 25 May 2018 02:31 )  <0.015 ng/mL / x     / x     / x     / x      CARDIAC MARKERS ( 24 May 2018 20:09 )  <0.015 ng/mL / x     / 349 U/L / x     / x          Urinalysis Basic - ( 24 May 2018 20:09 )    Color: Yellow / Appearance: Clear / S.015 / pH: x  Gluc: x / Ketone: Trace  / Bili: Negative / Urobili: Negative mg/dL   Blood: x / Protein: 15 mg/dL / Nitrite: Negative   Leuk Esterase: Moderate / RBC: 3-5 /HPF / WBC 6-10   Sq Epi: x / Non Sq Epi: Few / Bacteria: Moderate          MEDICATIONS:    amantadine 100 milliGRAM(s) Oral <User Schedule>  atorvastatin 20 milliGRAM(s) Oral at bedtime  carbidopa/levodopa  25/100 0.5 Tablet(s) Oral <User Schedule>  cefTRIAXone Injectable. 1000 milliGRAM(s) IV Push every 24 hours  FLUoxetine 20 milliGRAM(s) Oral daily  latanoprost 0.005% Ophthalmic Solution 1 Drop(s) Both EYES at bedtime  levothyroxine 100 MICROGram(s) Oral daily  metoprolol tartrate 12.5 milliGRAM(s) Oral at bedtime  rasagiline Tablet 0.5 milliGRAM(s) Oral daily    MEDICATIONS  (PRN):  acetaminophen   Tablet 650 milliGRAM(s) Oral every 8 hours PRN For Temp greater than 38 C (100.4 F)  acetaminophen   Tablet. 650 milliGRAM(s) Oral every 8 hours PRN Mild Pain (1 - 3)        Assessment and Plan:   Assessment:  · Assessment		  88 yo female presented after syncope.    A/P:    1.  Syncope possibly due to Parkinson  disease  s/p Fall  Scalp hematoma  Fall precautions.  Check orthostatic  Dr Velázquez follow up appreciated.    2. Small SAH due to fall on comadin  Confusion- metabolic encephalopathy  Doubt acute CVA.  Dr Mcfarland consult  appreciated.  S/P [facility]  Follow INR  Hold coumadin.  Repeat CT head tomorrow.     3.   Parkinson's disease   -continue Home medications    4.  HTN  -follow BP and adjust medication as needed    5.   Hypothyroidism  -Continue levothyroxine     6.  UTI  -started on ceftriaxone  -follow urine cx    7.  Hypokalemia-replaced  Follo    8.   Hyponatremia-resolved

## 2018-05-26 NOTE — PROGRESS NOTE ADULT - SUBJECTIVE AND OBJECTIVE BOX
88 yo female admitted 2 days ago for fall from syncope Past medical history of hypertension, parkinson's afib on coumadin.  Had a transient episode of confusion today with small left sylvian subarachnoid hemorrhage  on CT Brain.  Exam: facial ecchymosis alert and oriented, moves all extremities.  CT Brain reviewed.  Suggest: reverse coumadin, repeat CT Brain in 24 hours. Maintain in monitored unit.  CTA to rule out aneurysm.  Neurology consult.

## 2018-05-26 NOTE — CDI QUERY NOTE - NSCDIOTHERTXTBX_GEN_ALL_CORE_HH
# 1: Documentation on chart of Syncope s/p fall with LOC on 5/24/2018. GCS was 15.  Cat scan head on 5/24/18 showed large right frontal scalp hematoma. Pt. on 5/26/2018 was a Code Stroke. AMS and Facial droop. As per the Intensivist a Cat scan on 5/26/18 now showed small right traumatic SAH in [place] fissure with no mass effect. If you agree with this diagnosis please document as well as if this was PRESENT ON ADMISSION. If you disagree with this diagnosis please document as well.     # 2: Documentation on chart of Sodium level of 131 and 135. Based on the clinical indications please clarify a diagnosis.  A) Suspected Hyponatremia  B) No clinical indicators for Hyponatremia  C) Other ( Please specify condition)

## 2018-05-26 NOTE — PROGRESS NOTE ADULT - SUBJECTIVE AND OBJECTIVE BOX
Responded to Code stroke in CICU      CC:  R/O stroke    HPI:    88 y/o female with Parkinson and chronic AF on coumadin admitted on  following fall down stairs noted to be forgetful this morning prompting code stroke  On arrival, awake and alert, non focal.  HCT reveal new small R SAH [place] fissure.  INR 1.7     Above d/w CICU staff and dr yepez--consider tx  FFP 2u now--will obtain NSGY consult      PMH:  As above.     PSH:  As above.     FH: Non Contributory other than those listed in HPI    Social History:      MEDICATIONS  (STANDING):  amantadine 100 milliGRAM(s) Oral <User Schedule>  atorvastatin 20 milliGRAM(s) Oral at bedtime  carbidopa/levodopa  25/100 0.5 Tablet(s) Oral <User Schedule>  cefTRIAXone Injectable. 1000 milliGRAM(s) IV Push every 24 hours  FLUoxetine 20 milliGRAM(s) Oral daily  latanoprost 0.005% Ophthalmic Solution 1 Drop(s) Both EYES at bedtime  levothyroxine 100 MICROGram(s) Oral daily  metoprolol tartrate 12.5 milliGRAM(s) Oral at bedtime  rasagiline Tablet 0.5 milliGRAM(s) Oral daily  warfarin 1 milliGRAM(s) Oral daily    MEDICATIONS  (PRN):  acetaminophen   Tablet 650 milliGRAM(s) Oral every 8 hours PRN For Temp greater than 38 C (100.4 F)  acetaminophen   Tablet. 650 milliGRAM(s) Oral every 8 hours PRN Mild Pain (1 - 3)      Allergies: NKDA    ROS:  SEE BELOW        ICU Vital Signs Last 24 Hrs  T(C): 36.4 (26 May 2018 05:40), Max: 36.7 (25 May 2018 23:22)  T(F): 97.6 (26 May 2018 05:40), Max: 98 (25 May 2018 23:22)  HR: 76 (26 May 2018 08:00) (69 - 101)  BP: 161/90 (26 May 2018 08:00) (106/47 - 188/61)  BP(mean): 108 (26 May 2018 08:00) (62 - 119)  ABP: --  ABP(mean): --  RR: 17 (26 May 2018 08:00) (13 - 30)  SpO2: 96% (26 May 2018 08:00) (93% - 99%)          I&O's Summary    25 May 2018 07:01  -  26 May 2018 07:00  --------------------------------------------------------  IN: 0 mL / OUT: 2 mL / NET: -2 mL        Physical Exam:  SEE BELOW                          13.4   8.37  )-----------( 183      ( 25 May 2018 05:10 )             38.1       05-25    134<L>  |  102  |  16  ----------------------------<  111<H>  3.2<L>   |  25  |  0.88    Ca    8.8      25 May 2018 05:10    TPro  7.7  /  Alb  3.7  /  TBili  0.9  /  DBili  x   /  AST  25  /  ALT  9<L>  /  AlkPhos  93  05-24      CARDIAC MARKERS ( 25 May 2018 05:10 )  <0.015 ng/mL / x     / x     / x     / x      CARDIAC MARKERS ( 25 May 2018 02:31 )  <0.015 ng/mL / x     / x     / x     / x      CARDIAC MARKERS ( 24 May 2018 20:09 )  <0.015 ng/mL / x     / 349 U/L / x     / x                Urinalysis Basic - ( 24 May 2018 20:09 )    Color: Yellow / Appearance: Clear / S.015 / pH: x  Gluc: x / Ketone: Trace  / Bili: Negative / Urobili: Negative mg/dL   Blood: x / Protein: 15 mg/dL / Nitrite: Negative   Leuk Esterase: Moderate / RBC: 3-5 /HPF / WBC 6-10   Sq Epi: x / Non Sq Epi: Few / Bacteria: Moderate        DVT Prophylaxis:                                                            Contraindication:     Advanced Directives:    Discussed with:    Visit Information:  Time spent excluding procedure:  30 cc mins    ** Time is exclusive of billed procedures and/or teaching and/or routine family updates.

## 2018-05-26 NOTE — CHART NOTE - NSCHARTNOTEFT_GEN_A_CORE
Code Stroke called at 0800 on this 88 yo patient admitted s/p fall for altered mental status and seemingly worsening facial droop. Pt seen and examined at bedside. As per RN and daughters, patient has a facial droop at baseline and has resting tremor given Hx of Parkinson's disease. Patient [place] train [place] and that her daughters were sisters, but after further questioning and reorientation patient seemed to return to her baseline Pt awoke about 90 minutes prior this morning and had not received any meds. Initial CT done on Thursday evening did not show any evidence of acute infract or hemorrhage.    VS      FSG      PHYSICAL EXAM  Gen: elderly female initially confused but now AAOx3  HEENT: ecchymoses on face  Neuro: resting tremor in LUE, mild left sided facial droop, 5/5 strength in all extremities, Babinksi negative b/l      A/P: 88 yo F with transient AMS, now resolved.  - STAT CT Head w/o contrast  - Will continue to monitor  - Will notify hospitalist      Isela Gibson MD PGY-1  Dr. Gallegos (PGY-2) and Dr. Gomez present for case and agree with the above plan. Code Stroke called at 0759 on this 88 yo patient admitted s/p fall for altered mental status and seemingly worsening facial droop. Pt seen and examined at bedside. As per RN and daughters, patient has a facial droop at baseline and has resting tremor given Hx of Parkinson's disease. Patient initially thought she was at a train station and that her daughters were sisters, but after further questioning and reorientation patient seemed to return to her baseline Pt awoke about 90 minutes prior this morning and had not received any meds. Initial CT done on Thursday evening did not show any evidence of acute infract or hemorrhage. Pt is on Abx for possible UTI.     VS  /76  T 97.1  FSG  100      PHYSICAL EXAM  Gen: elderly female initially confused but now AAOx3  HEENT: ecchymoses on face  Neuro: resting tremor in LUE, mild left sided facial droop, 5/5 strength in all extremities, Babinksi negative b/l    MEDICATIONS  (STANDING):  amantadine 100 milliGRAM(s) Oral <User Schedule>  atorvastatin 20 milliGRAM(s) Oral at bedtime  carbidopa/levodopa  25/100 0.5 Tablet(s) Oral <User Schedule>  cefTRIAXone Injectable. 1000 milliGRAM(s) IV Push every 24 hours  FLUoxetine 20 milliGRAM(s) Oral daily  latanoprost 0.005% Ophthalmic Solution 1 Drop(s) Both EYES at bedtime  levothyroxine 100 MICROGram(s) Oral daily  metoprolol tartrate 12.5 milliGRAM(s) Oral at bedtime  rasagiline Tablet 0.5 milliGRAM(s) Oral daily  warfarin 1 milliGRAM(s) Oral daily          A/P: 88 yo F with transient AMS, now resolved.  - STAT CT Head w/o contrast  - Will continue to monitor  - Will notify hospitalist      Isela Gibson MD PGY-1  Dr. Gallegos (PGY-2) and Dr. Gomez present for case and agree with the above plan.

## 2018-05-27 LAB
ANION GAP SERPL CALC-SCNC: 7 MMOL/L — SIGNIFICANT CHANGE UP (ref 5–17)
BUN SERPL-MCNC: 14 MG/DL — SIGNIFICANT CHANGE UP (ref 7–23)
CALCIUM SERPL-MCNC: 9 MG/DL — SIGNIFICANT CHANGE UP (ref 8.5–10.1)
CHLORIDE SERPL-SCNC: 101 MMOL/L — SIGNIFICANT CHANGE UP (ref 96–108)
CO2 SERPL-SCNC: 28 MMOL/L — SIGNIFICANT CHANGE UP (ref 22–31)
CREAT SERPL-MCNC: 0.85 MG/DL — SIGNIFICANT CHANGE UP (ref 0.5–1.3)
GLUCOSE SERPL-MCNC: 106 MG/DL — HIGH (ref 70–99)
HCT VFR BLD CALC: 36.9 % — SIGNIFICANT CHANGE UP (ref 34.5–45)
HGB BLD-MCNC: 12.8 G/DL — SIGNIFICANT CHANGE UP (ref 11.5–15.5)
INR BLD: 1.24 RATIO — HIGH (ref 0.88–1.16)
MCHC RBC-ENTMCNC: 31.2 PG — SIGNIFICANT CHANGE UP (ref 27–34)
MCHC RBC-ENTMCNC: 34.7 GM/DL — SIGNIFICANT CHANGE UP (ref 32–36)
MCV RBC AUTO: 90 FL — SIGNIFICANT CHANGE UP (ref 80–100)
NRBC # BLD: 0 /100 WBCS — SIGNIFICANT CHANGE UP (ref 0–0)
PLATELET # BLD AUTO: 198 K/UL — SIGNIFICANT CHANGE UP (ref 150–400)
POTASSIUM SERPL-MCNC: 4 MMOL/L — SIGNIFICANT CHANGE UP (ref 3.5–5.3)
POTASSIUM SERPL-SCNC: 4 MMOL/L — SIGNIFICANT CHANGE UP (ref 3.5–5.3)
PROTHROM AB SERPL-ACNC: 13.5 SEC — HIGH (ref 9.8–12.7)
RBC # BLD: 4.1 M/UL — SIGNIFICANT CHANGE UP (ref 3.8–5.2)
RBC # FLD: 13.2 % — SIGNIFICANT CHANGE UP (ref 10.3–14.5)
SODIUM SERPL-SCNC: 136 MMOL/L — SIGNIFICANT CHANGE UP (ref 135–145)
WBC # BLD: 7.82 K/UL — SIGNIFICANT CHANGE UP (ref 3.8–10.5)
WBC # FLD AUTO: 7.82 K/UL — SIGNIFICANT CHANGE UP (ref 3.8–10.5)

## 2018-05-27 PROCEDURE — 70450 CT HEAD/BRAIN W/O DYE: CPT | Mod: 26

## 2018-05-27 RX ADMIN — Medication 100 MILLIGRAM(S): at 11:34

## 2018-05-27 RX ADMIN — LATANOPROST 1 DROP(S): 0.05 SOLUTION/ DROPS OPHTHALMIC; TOPICAL at 21:24

## 2018-05-27 RX ADMIN — CEFTRIAXONE 1000 MILLIGRAM(S): 500 INJECTION, POWDER, FOR SOLUTION INTRAMUSCULAR; INTRAVENOUS at 11:35

## 2018-05-27 RX ADMIN — ATORVASTATIN CALCIUM 20 MILLIGRAM(S): 80 TABLET, FILM COATED ORAL at 21:24

## 2018-05-27 RX ADMIN — Medication 20 MILLIGRAM(S): at 11:34

## 2018-05-27 RX ADMIN — CARBIDOPA AND LEVODOPA 0.5 TABLET(S): 25; 100 TABLET ORAL at 08:40

## 2018-05-27 RX ADMIN — CARBIDOPA AND LEVODOPA 0.5 TABLET(S): 25; 100 TABLET ORAL at 11:34

## 2018-05-27 RX ADMIN — RASAGILINE 0.5 MILLIGRAM(S): 0.5 TABLET ORAL at 11:33

## 2018-05-27 RX ADMIN — Medication 100 MICROGRAM(S): at 05:58

## 2018-05-27 RX ADMIN — CARBIDOPA AND LEVODOPA 0.5 TABLET(S): 25; 100 TABLET ORAL at 17:00

## 2018-05-27 RX ADMIN — Medication 12.5 MILLIGRAM(S): at 20:13

## 2018-05-27 RX ADMIN — Medication 100 MILLIGRAM(S): at 08:40

## 2018-05-27 NOTE — PROGRESS NOTE ADULT - SUBJECTIVE AND OBJECTIVE BOX
CHIEF COMPLAINT: Patient is a 87y old  Female who presents with a chief complaint of syncope and fall (25 May 2018 02:00)      HPI:  86 y/o female with a PMHx of Parkinson's, and Afib on coumadin (levels last checked last week, 2.2) presents to the ED s/p syncope and fall down steps today. Pt states she was going down the stairs, but before she reached the bottom of the stairs she felt herself blacking out. Pt knew she was going to fall so she tried to brace herself on the railing. Fell and hit her head on door, landed on R hip. Due to pt's h/o Parkinson's, she states she feels dizzy and lightheaded at baseline and felt dizzy before falling. At time of eval, pt c/o pain to head and slight, pain to R hip. No other acute complaints at time of eval. (25 May 2018 02:00)      5/26- code stroke called this AM for confusion- finding of ICH        PMHx: PAST MEDICAL & SURGICAL HISTORY:  Parkinson's disease  Afib  No significant past surgical history        Allergies: Allergies    amoxicillin (Unknown)  contrast (Unknown)    Intolerances          REVIEW OF SYSTEMS:    CONSTITUTIONAL: No weakness, fevers or chills  EYES/ENT: No visual changes;  No vertigo or throat pain   NECK: No pain or stiffness  RESPIRATORY: No cough, wheezing, hemoptysis; No shortness of breath  CARDIOVASCULAR: No chest pain or palpitations  GASTROINTESTINAL: No abdominal or epigastric pain. No nausea, vomiting, or hematemesis; No diarrhea or constipation. No melena or hematochezia.  GENITOURINARY: No dysuria, frequency or hematuria  NEUROLOGICAL: No numbness or weakness  SKIN: No itching, burning, rashes, or lesions   All other review of systems is negative unless indicated above    ICU Vital Signs Last 24 Hrs  T(C): 36.7 (27 May 2018 06:00), Max: 36.7 (27 May 2018 06:00)  T(F): 98 (27 May 2018 06:00), Max: 98 (27 May 2018 06:00)  HR: 64 (27 May 2018 07:00) (60 - 87)  BP: 121/58 (27 May 2018 07:00) (112/58 - 173/81)  BP(mean): 71 (27 May 2018 07:00) (36 - 111)  ABP: --  ABP(mean): --  RR: 16 (27 May 2018 07:00) (14 - 27)  SpO2: 96% (27 May 2018 07:00) (94% - 97%)        PHYSICAL EXAM:   Constitutional: NAD, awake and alert,  right periorbital ecchymosis  HEENT: PERR, EOMI, Normal Hearing, MMM  Neck: Soft and supple, No LAD, No JVD  Respiratory: Breath sounds are clear bilaterally, No wheezing, rales or rhonchi  Cardiovascular: S1 and S2, regular rate and rhythm, no Murmurs, gallops or rubs  Gastrointestinal: Bowel Sounds present, soft, nontender, nondistended, no guarding, no rebound  Extremities: No peripheral edema  Vascular: 2+ peripheral pulses  Neurological: A/O x 3, no focal deficits  Musculoskeletal: 5/5 strength b/l upper and lower extremities  Skin: No rashes    MEDICATIONS  (STANDING):  amantadine 100 milliGRAM(s) Oral <User Schedule>  atorvastatin 20 milliGRAM(s) Oral at bedtime  carbidopa/levodopa  25/100 0.5 Tablet(s) Oral <User Schedule>  cefTRIAXone Injectable. 1000 milliGRAM(s) IV Push every 24 hours  FLUoxetine 20 milliGRAM(s) Oral daily  latanoprost 0.005% Ophthalmic Solution 1 Drop(s) Both EYES at bedtime  levothyroxine 100 MICROGram(s) Oral daily  metoprolol tartrate 12.5 milliGRAM(s) Oral at bedtime  rasagiline Tablet 0.5 milliGRAM(s) Oral daily    LABS: All Labs Reviewed:                                12.8   7.82  )-----------( 198      ( 27 May 2018 05:41 )             36.9   05-27    136  |  101  |  14  ----------------------------<  106<H>  4.0   |  28  |  0.85    Ca    9.0      27 May 2018 05:41        RADIOLOGY: CT head < from: CT Head No Cont (05.24.18 @ 19:33) >  IMPRESSION: Moderate to large right frontal scalp hematoma.  Moderate   chronic microvascular changes without evidence of an acute transcortical   infarction or hemorrhage. MR is a more sensitive imaging modality for the   evaluation of an acute infarction.     EMILIA TSAI   This document has been electronically signed. May 24 2018  7:37PM    < end of copied text >      EKG: SR, RBBB inferior and lateral T wave changes     Telemetry:  SR    ECHO: < from: Transthoracic Echocardiogram (05.25.18 @ 08:37) >   Summary     The left ventricle is normal in size, wall motion and contractility.   Mild concentric left ventricular hypertrophy is present.   Estimated left ventricular ejection fraction is 60-65 %.   Normal aortic valve structure and function.   Trace aortic regurgitation is present.   Normal appearing mitral valve structure and function.   EA reversal of the mitral inflow consistent with reduced compliance of   the   left ventricle.   Mild (1+) mitral regurgitation is present.   Normal appearing tricuspid valve structure and function.   Mild (1+) tricuspid valve regurgitation is present.   Mild pulmonary hypertension.     Signature    < end of copied text >    CT< from: CT Angio Head w/ IV Cont (05.26.18 @ 11:29) >      IMPRESSION:          1.   Right carotid system:  high-grade (70-90%) stenosis at the   distal RIGHT common carotid and proximal RIGHT internal carotid artery.           2.   Left carotid system:  mild (less than 30%) stenosis at the   origin of the LEFT internal carotid artery.              3.   Intracranial circulation:  No significant vascular lesion.             4.   Brain:  Sylvian fissure subarachnoid hemorrhage is again found         < end of copied text >

## 2018-05-27 NOTE — PROGRESS NOTE ADULT - SUBJECTIVE AND OBJECTIVE BOX
88 y/o female with a PMHx of Parkinson's, and Afib on coumadin (levels last checked last week, 2.2) presents to the ED s/p syncope and fall down steps today. Pt states she was going down the stairs, but before she reached the bottom of the stairs she felt herself blacking out. Pt knew she was going to fall so she tried to brace herself on the railing. Fell and hit her head on door, landed on R hip. Due to pt's h/o Parkinson's, she states she feels dizzy and lightheaded at baseline and felt dizzy before falling. At time of eval, pt c/o pain to head and slight, pain to R hip. No other acute complaints at time of eval.      05/26/18: Patient seen and examined. S/P code stroke this morning. She had repeat CT head: small SAH. Discussed with 2 daughters at bed side regarding management plan.   05/27/18: Patient seen and examined. Sitting in a chair, feels better today. Discussed with patient and family members at bed side regarding management and d/c plan.       Vital Signs Last 24 Hrs  T(C): 36.7 (27 May 2018 06:00), Max: 36.7 (27 May 2018 06:00)  T(F): 98 (27 May 2018 06:00), Max: 98 (27 May 2018 06:00)  HR: 79 (27 May 2018 10:38) (60 - 87)  BP: 106/58 (27 May 2018 10:38) (106/58 - 173/81)  BP(mean): 66 (27 May 2018 10:38) (36 - 111)  RR: 24 (27 May 2018 10:38) (14 - 27)  SpO2: 99% (27 May 2018 10:38) (94% - 99%)    Physical exam:       · CARDIAC: Normal rate, regular rhythm.  Heart sounds S1, S2.  No murmurs, rubs or gallops.	  · RESPIRATORY: Breath sounds clear and equal bilaterally.	  · GASTROINTESTINAL: Abdomen soft, non-tender, no guarding.	  · NEUROLOGICAL: - - -	  · Level of Consciousness: alert	  · Speech: clear	  · SKIN: Skin normal color for race, warm, dry and intact. No evidence of rash.	              Labs:                               12.8   7.82  )-----------( 198      ( 27 May 2018 05:41 )             36.9     27 May 2018 05:41    136    |  101    |  14     ----------------------------<  106    4.0     |  28     |  0.85     Ca    9.0        27 May 2018 05:41        PT/INR - ( 27 May 2018 05:41 )   PT: 13.5 sec;   INR: 1.24 ratio             MEDICATIONS:    amantadine 100 milliGRAM(s) Oral <User Schedule>  atorvastatin 20 milliGRAM(s) Oral at bedtime  carbidopa/levodopa  25/100 0.5 Tablet(s) Oral <User Schedule>  cefTRIAXone Injectable. 1000 milliGRAM(s) IV Push every 24 hours  FLUoxetine 20 milliGRAM(s) Oral daily  latanoprost 0.005% Ophthalmic Solution 1 Drop(s) Both EYES at bedtime  levothyroxine 100 MICROGram(s) Oral daily  metoprolol tartrate 12.5 milliGRAM(s) Oral at bedtime  rasagiline Tablet 0.5 milliGRAM(s) Oral daily    MEDICATIONS  (PRN):  acetaminophen   Tablet 650 milliGRAM(s) Oral every 8 hours PRN For Temp greater than 38 C (100.4 F)  acetaminophen   Tablet. 650 milliGRAM(s) Oral every 8 hours PRN Mild Pain (1 - 3)        Assessment and Plan:   Assessment:  · Assessment		  88 yo female presented after syncope.    A/P:    1.  Syncope possibly due to Parkinson  disease  s/p Fall  Scalp hematoma  Fall precautions.  Check orthostatic  Dr Velázquez follow up appreciated.    2. Small SAH due to fall on coumadin  Confusion- metabolic encephalopathy: resolved  Doubt acute CVA.  Dr Mcfarland consult and follow up appreciated.  S/P Kcentra  Follow INR  Hold coumadin.  Repeat CT head today: unchanged     3.   Parkinson's disease   -continue Home medications    4.  HTN  -follow BP and adjust medication as needed    5.   Hypothyroidism  -Continue levothyroxine     6.  UTI  -started on ceftriaxone, d/c  -follow urine cx: no growth    7.  Hypokalemia-replaced    8.   Hyponatremia-resolved

## 2018-05-27 NOTE — PROGRESS NOTE ADULT - SUBJECTIVE AND OBJECTIVE BOX
Patient remains neurologically stable, alert and oriented with good equal power in extremities.   Follow up CT Brain pending.

## 2018-05-28 PROCEDURE — 99223 1ST HOSP IP/OBS HIGH 75: CPT

## 2018-05-28 RX ORDER — AMLODIPINE BESYLATE 2.5 MG/1
2.5 TABLET ORAL DAILY
Qty: 0 | Refills: 0 | Status: DISCONTINUED | OUTPATIENT
Start: 2018-05-28 | End: 2018-05-29

## 2018-05-28 RX ADMIN — Medication 100 MILLIGRAM(S): at 12:34

## 2018-05-28 RX ADMIN — Medication 20 MILLIGRAM(S): at 12:34

## 2018-05-28 RX ADMIN — Medication 100 MICROGRAM(S): at 05:44

## 2018-05-28 RX ADMIN — AMLODIPINE BESYLATE 2.5 MILLIGRAM(S): 2.5 TABLET ORAL at 21:48

## 2018-05-28 RX ADMIN — CARBIDOPA AND LEVODOPA 0.5 TABLET(S): 25; 100 TABLET ORAL at 18:05

## 2018-05-28 RX ADMIN — Medication 100 MILLIGRAM(S): at 12:35

## 2018-05-28 RX ADMIN — Medication 650 MILLIGRAM(S): at 23:55

## 2018-05-28 RX ADMIN — Medication 12.5 MILLIGRAM(S): at 21:48

## 2018-05-28 RX ADMIN — LATANOPROST 1 DROP(S): 0.05 SOLUTION/ DROPS OPHTHALMIC; TOPICAL at 21:48

## 2018-05-28 RX ADMIN — CARBIDOPA AND LEVODOPA 0.5 TABLET(S): 25; 100 TABLET ORAL at 12:34

## 2018-05-28 RX ADMIN — ATORVASTATIN CALCIUM 20 MILLIGRAM(S): 80 TABLET, FILM COATED ORAL at 21:48

## 2018-05-28 RX ADMIN — RASAGILINE 0.5 MILLIGRAM(S): 0.5 TABLET ORAL at 12:34

## 2018-05-28 RX ADMIN — Medication 0.1 MILLIGRAM(S): at 00:00

## 2018-05-28 RX ADMIN — CARBIDOPA AND LEVODOPA 0.5 TABLET(S): 25; 100 TABLET ORAL at 12:35

## 2018-05-28 NOTE — PROGRESS NOTE ADULT - SUBJECTIVE AND OBJECTIVE BOX
CHIEF COMPLAINT: Patient is a 87y old  Female who presents with a chief complaint of syncope and fall (25 May 2018 02:00)      HPI:  88 y/o female with a PMHx of Parkinson's, and Afib on coumadin (levels last checked last week, 2.2) presents to the ED s/p syncope and fall down steps today. Pt states she was going down the stairs, but before she reached the bottom of the stairs she felt herself blacking out. Pt knew she was going to fall so she tried to brace herself on the railing. Fell and hit her head on door, landed on R hip. Due to pt's h/o Parkinson's, she states she feels dizzy and lightheaded at baseline and felt dizzy before falling. At time of eval, pt c/o pain to head and slight, pain to R hip. No other acute complaints at time of eval. (25 May 2018 02:00)    5/26- code stroke called this AM for confusion- finding of ICH    5/28 No acute issues overnight , labile blood pressure       PMHx: PAST MEDICAL & SURGICAL HISTORY:  Parkinson's disease  Afib  No significant past surgical history        Allergies: Allergies    amoxicillin (Unknown)  contrast (Unknown)    Intolerances          REVIEW OF SYSTEMS:    CONSTITUTIONAL: No weakness, fevers or chills  EYES/ENT: No visual changes;  No vertigo or throat pain   NECK: No pain or stiffness  RESPIRATORY: No cough, wheezing, hemoptysis; No shortness of breath  CARDIOVASCULAR: No chest pain or palpitations  GASTROINTESTINAL: No abdominal or epigastric pain. No nausea, vomiting, or hematemesis; No diarrhea or constipation. No melena or hematochezia.  GENITOURINARY: No dysuria, frequency or hematuria  NEUROLOGICAL: No numbness or weakness  SKIN: No itching, burning, rashes, or lesions   All other review of systems is negative unless indicated above    ICU Vital Signs Last 24 Hrs  T(C): 36.1 (28 May 2018 04:00), Max: 36.2 (27 May 2018 19:36)  T(F): 97 (28 May 2018 04:00), Max: 97.1 (27 May 2018 19:36)  HR: 68 (28 May 2018 07:27) (56 - 83)  BP: 153/73 (28 May 2018 07:27) (99/51 - 199/66)  BP(mean): 94 (28 May 2018 07:27) (60 - 112)  ABP: --  ABP(mean): --  RR: 19 (28 May 2018 07:27) (14 - 24)  SpO2: 98% (28 May 2018 07:27) (95% - 100%)        PHYSICAL EXAM:   Constitutional: NAD, awake and alert,  right periorbital ecchymosis  HEENT: PERR, EOMI, Normal Hearing, MMM  Neck: Soft and supple, No LAD, No JVD  Respiratory: Breath sounds are clear bilaterally, No wheezing, rales or rhonchi  Cardiovascular: S1 and S2, regular rate and rhythm, no Murmurs, gallops or rubs  Gastrointestinal: Bowel Sounds present, soft, nontender, nondistended, no guarding, no rebound  Extremities: No peripheral edema  Vascular: 2+ peripheral pulses  Neurological: A/O x 3, no focal deficits  Musculoskeletal: 5/5 strength b/l upper and lower extremities  Skin: No rashes      MEDICATIONS  (STANDING):  amantadine 100 milliGRAM(s) Oral <User Schedule>  atorvastatin 20 milliGRAM(s) Oral at bedtime  carbidopa/levodopa  25/100 0.5 Tablet(s) Oral <User Schedule>  FLUoxetine 20 milliGRAM(s) Oral daily  latanoprost 0.005% Ophthalmic Solution 1 Drop(s) Both EYES at bedtime  levothyroxine 100 MICROGram(s) Oral daily  metoprolol tartrate 12.5 milliGRAM(s) Oral at bedtime  rasagiline Tablet 0.5 milliGRAM(s) Oral daily                          12.8   7.82  )-----------( 198      ( 27 May 2018 05:41 )             36.9   05-27    136  |  101  |  14  ----------------------------<  106<H>  4.0   |  28  |  0.85    Ca    9.0      27 May 2018 05:41        RADIOLOGY: CT head < from: CT Head No Cont (05.24.18 @ 19:33) >  IMPRESSION: Moderate to large right frontal scalp hematoma.  Moderate   chronic microvascular changes without evidence of an acute transcortical   infarction or hemorrhage. MR is a more sensitive imaging modality for the   evaluation of an acute infarction.     EMILIA TSAI   This document has been electronically signed. May 24 2018  7:37PM    < end of copied text >      EKG: SR, RBBB inferior and lateral T wave changes     Telemetry:  SR    ECHO: < from: Transthoracic Echocardiogram (05.25.18 @ 08:37) >   Summary     The left ventricle is normal in size, wall motion and contractility.   Mild concentric left ventricular hypertrophy is present.   Estimated left ventricular ejection fraction is 60-65 %.   Normal aortic valve structure and function.   Trace aortic regurgitation is present.   Normal appearing mitral valve structure and function.   EA reversal of the mitral inflow consistent with reduced compliance of   the   left ventricle.   Mild (1+) mitral regurgitation is present.   Normal appearing tricuspid valve structure and function.   Mild (1+) tricuspid valve regurgitation is present.   Mild pulmonary hypertension.     Signature    < end of copied text >    CT< from: CT Angio Head w/ IV Cont (05.26.18 @ 11:29) >      IMPRESSION:          1.   Right carotid system:  high-grade (70-90%) stenosis at the   distal RIGHT common carotid and proximal RIGHT internal carotid artery.           2.   Left carotid system:  mild (less than 30%) stenosis at the   origin of the LEFT internal carotid artery.              3.   Intracranial circulation:  No significant vascular lesion.             4.   Brain:  Sylvian fissure subarachnoid hemorrhage is again found         < end of copied text >

## 2018-05-28 NOTE — PROVIDER CONTACT NOTE (OTHER) - ACTION/TREATMENT ORDERED:
clonidine 0.1mg PO x1
CT head done, [facility]  surgery notified dr. coronel at bedside to assess pt. ct angio done k-centra given. pt. monitor for changes.

## 2018-05-28 NOTE — CONSULT NOTE ADULT - ASSESSMENT
88 y/o female with a PMHx of Parkinson's, and Afib on coumadin (levels last checked last week, 2.2) presents to the ED s/p syncope and fall down steps today. Pt states she was going down the stairs, but before she reached the bottom of the stairs she felt herself blacking out. Pt knew she was going to fall so she tried to brace herself on the railing. Fell and hit her head on door, landed on R hip. Due to pt's h/o Parkinson's, she states she feels dizzy and lightheaded at baseline and felt dizzy before falling. At time of eval, pt c/o pain to head and slight, pain to R hip.    Syncope possibly due to Parkinson  disease/ Orthostatic Hypotension with recurrent falls .   Small SAH due to fall on coumadin  Confusion- metabolic encephalopathy    resolved  Repeat CT stable.  Hold Coumadin till Next week.  F/u CT .  Ok for D/c planning for rehab.  Discussed with Pt, daughter and dR. Velázquez.  F/u with dr. beatty after D/c.
87 F with parkinsons , labile BP with syncopal episode     no evidence for ACS,     recommend hydration, ( if orthostatics are checked, she Will be orthostatic) that is her baseline    recommend echocardiogram to evalaute LV fucntion, - if echo grossly normal, no cardiac contraindication to DC,      she already has follow up in office next week

## 2018-05-28 NOTE — CONSULT NOTE ADULT - SUBJECTIVE AND OBJECTIVE BOX
CHIEF COMPLAINT: Patient is a 87y old  Female who presents with a chief complaint of syncope and fall (25 May 2018 02:00)      HPI:  88 y/o female with a PMHx of Parkinson's, and Afib on coumadin (levels last checked last week, 2.2) presents to the ED s/p syncope and fall down steps today. Pt states she was going down the stairs, but before she reached the bottom of the stairs she felt herself blacking out. Pt knew she was going to fall so she tried to brace herself on the railing. Fell and hit her head on door, landed on R hip. Due to pt's h/o Parkinson's, she states she feels dizzy and lightheaded at baseline and felt dizzy before falling. At time of eval, pt c/o pain to head and slight, pain to R hip. No other acute complaints at time of eval. (25 May 2018 02:00)      PMHx: PAST MEDICAL & SURGICAL HISTORY:  Parkinson's disease  Afib  No significant past surgical history        Soc Hx:       Allergies: Allergies    amoxicillin (Unknown)  contrast (Unknown)    Intolerances          REVIEW OF SYSTEMS:    CONSTITUTIONAL: No weakness, fevers or chills  EYES/ENT: No visual changes;  No vertigo or throat pain   NECK: No pain or stiffness  RESPIRATORY: No cough, wheezing, hemoptysis; No shortness of breath  CARDIOVASCULAR: No chest pain or palpitations  GASTROINTESTINAL: No abdominal or epigastric pain. No nausea, vomiting, or hematemesis; No diarrhea or constipation. No melena or hematochezia.  GENITOURINARY: No dysuria, frequency or hematuria  NEUROLOGICAL: No numbness or weakness  SKIN: No itching, burning, rashes, or lesions   All other review of systems is negative unless indicated above    Vital Signs Last 24 Hrs  T(C): 36.3 (25 May 2018 03:58), Max: 36.8 (25 May 2018 03:02)  T(F): 97.3 (25 May 2018 03:58), Max: 98.2 (25 May 2018 03:02)  HR: 78 (25 May 2018 06:00) (72 - 88)  BP: 102/51 (25 May 2018 06:00) (102/51 - 222/102)  BP(mean): 61 (25 May 2018 06:00) (61 - 69)  RR: 18 (25 May 2018 06:00) (18 - 20)  SpO2: 98% (25 May 2018 06:00) (96% - 100%)    I&O's Summary          PHYSICAL EXAM:   Constitutional: NAD, awake and alert,  right periorbital ecchymosis  HEENT: PERR, EOMI, Normal Hearing, MMM  Neck: Soft and supple, No LAD, No JVD  Respiratory: Breath sounds are clear bilaterally, No wheezing, rales or rhonchi  Cardiovascular: S1 and S2, regular rate and rhythm, no Murmurs, gallops or rubs  Gastrointestinal: Bowel Sounds present, soft, nontender, nondistended, no guarding, no rebound  Extremities: No peripheral edema  Vascular: 2+ peripheral pulses  Neurological: A/O x 3, no focal deficits  Musculoskeletal: 5/5 strength b/l upper and lower extremities  Skin: No rashes    MEDICATIONS:  MEDICATIONS  (STANDING):  amantadine 100 milliGRAM(s) Oral <User Schedule>  atorvastatin 20 milliGRAM(s) Oral at bedtime  carbidopa/levodopa  25/100 0.5 Tablet(s) Oral <User Schedule>  cefTRIAXone Injectable. 1000 milliGRAM(s) IV Push every 24 hours  FLUoxetine 20 milliGRAM(s) Oral daily  latanoprost 0.005% Ophthalmic Solution 1 Drop(s) Both EYES at bedtime  levothyroxine 100 MICROGram(s) Oral daily  metoprolol tartrate 12.5 milliGRAM(s) Oral at bedtime  rasagiline Tablet 0.5 milliGRAM(s) Oral daily  warfarin 1 milliGRAM(s) Oral daily      LABS: All Labs Reviewed:                        13.4   8.37  )-----------( 183      ( 25 May 2018 05:10 )             38.1     05-25    134<L>  |  102  |  16  ----------------------------<  111<H>  3.2<L>   |  25  |  0.88    Ca    8.8      25 May 2018 05:10    TPro  7.7  /  Alb  3.7  /  TBili  0.9  /  DBili  x   /  AST  25  /  ALT  9<L>  /  AlkPhos  93  05-24    PT/INR - ( 25 May 2018 05:10 )   PT: 24.9 sec;   INR: 2.27 ratio         PTT - ( 24 May 2018 20:09 )  PTT:36.0 sec  CARDIAC MARKERS ( 25 May 2018 05:10 )  <0.015 ng/mL / x     / x     / x     / x      CARDIAC MARKERS ( 25 May 2018 02:31 )  <0.015 ng/mL / x     / x     / x     / x      CARDIAC MARKERS ( 24 May 2018 20:09 )  <0.015 ng/mL / x     / 349 U/L / x     / x          RADIOLOGY: CT head < from: CT Head No Cont (05.24.18 @ 19:33) >  IMPRESSION: Moderate to large right frontal scalp hematoma.  Moderate   chronic microvascular changes without evidence of an acute transcortical   infarction or hemorrhage. MR is a more sensitive imaging modality for the   evaluation of an acute infarction.     EMILIA TSAI   This document has been electronically signed. May 24 2018  7:37PM    < end of copied text >      EKG: SR, RBBB inferior and lateral T wave changes     Telemetry:  SR    ECHO: pending
Patient is a 87y old  Female who presents with a chief complaint of syncope and fall (25 May 2018 02:00) and neurology consult requested for  evaluation for TBI with SAH      HPI:  88 y/o female with a PMHx of Parkinson's, and Afib on coumadin (levels last checked last week, 2.2) presents to the ED s/p syncope and fall down steps on 5/25/18. Pt states she was going down the stairs, but before she reached the bottom of the stairs she felt herself blacking out. Pt knew she was going to fall so she tried to brace herself on the railing. Fell and hit her head on door, landed on R hip. Due to pt's h/o Parkinson's, she states she feels dizzy and lightheaded at baseline and felt dizzy before falling. At time of eval, pt c/o pain to head and slight, pain to R hip. No other acute complaints at time of eval.  Multiple echymotic areas on face and neck. Seen by NS for SAH seen on CT brain . Mild confusion noted Yesterday morning which cleared today. H/o PD and being followed by Dr. Millan recently added amantadine twice  aday. As per Pt's daughter who is at bed side pt doesn't use any assistive devices.       PAST MEDICAL & SURGICAL HISTORY:  Parkinson's disease  Afib  No significant past surgical history      FAMILY HISTORY:  No pertinent family history in first degree relatives      Social Hx:  Nonsmoker, no drug or alcohol use    MEDICATIONS  (STANDING):  amantadine 100 milliGRAM(s) Oral <User Schedule>  amLODIPine   Tablet 2.5 milliGRAM(s) Oral daily  atorvastatin 20 milliGRAM(s) Oral at bedtime  carbidopa/levodopa  25/100 0.5 Tablet(s) Oral <User Schedule>  FLUoxetine 20 milliGRAM(s) Oral daily  latanoprost 0.005% Ophthalmic Solution 1 Drop(s) Both EYES at bedtime  levothyroxine 100 MICROGram(s) Oral daily  metoprolol tartrate 12.5 milliGRAM(s) Oral at bedtime  rasagiline Tablet 0.5 milliGRAM(s) Oral daily       Allergies    amoxicillin (Unknown)  contrast (Unknown)      ROS: Pertinent positives in HPI, all other ROS were reviewed and are negative.      Vital Signs Last 24 Hrs  T(C): 36.1 (28 May 2018 04:00), Max: 36.2 (27 May 2018 19:36)  T(F): 97 (28 May 2018 04:00), Max: 97.1 (27 May 2018 19:36)  HR: 67 (28 May 2018 08:00) (56 - 83)  BP: 153/73 (28 May 2018 07:27) (99/51 - 199/66)  BP(mean): 94 (28 May 2018 07:27) (60 - 112)  RR: 16 (28 May 2018 08:00) (14 - 24)  SpO2: 99% (28 May 2018 08:00) (95% - 100%)        Constitutional: awake and alert.  HEENT: PERRLA, EOMI,   Neck: Supple.  Respiratory: Breath sounds are clear bilaterally  Cardiovascular: S1 and S2,  irregular rhythm  Gastrointestinal: soft, nontender  Extremities:  no edema  Vascular: Caritid Bruit - no  Musculoskeletal: no joint swelling/tenderness, no abnormal movements  Skin: Multiple echymotic areas on face, neck and arms.    Neurological exam:  HF: A x O x 3. Appropriately interactive, normal affect. Speech fluent, No Aphasia.   CN: KERRIE, EOMI, VFF, facial sensation normal, no NLFD, tongue midline, gag intact  Motor: No pronator drift, Strength 5/5 in all 4 ext, bilat rest tremors with mild cogwheel rigidity.    Sens: Intact to light touch.  Reflexes: Symmetric and normal . BJ 2+, BR 2+, KJ 1+, AJ 1+, downgoing toes b/l  Coord:   bilat tremors  Gait/Balance: Cannot test      Labs:   05-27    136  |  101  |  14  ----------------------------<  106<H>  4.0   |  28  |  0.85    Ca    9.0      27 May 2018 05:41                                12.8   7.82  )-----------( 198      ( 27 May 2018 05:41 )             36.9       Radiology:  - CT Head: 5/27/18  < from: CT Head No Cont (05.27.18 @ 12:29) >  IMPRESSION:   Unchanged subarachnoid hemorrhage in the RIGHT sylvian   fissure.     < from: CT Angio Head w/ IV Cont (05.26.18 @ 11:29) >  IMPRESSION:          1.   Right carotid system:  high-grade (70-90%) stenosis at the   distal RIGHT common carotid and proximal RIGHT internal carotid artery.           2.   Left carotid system:  mild (less than 30%) stenosis at the   origin of the LEFT internal carotid artery.              3.   Intracranial circulation:  No significant vascular lesion.             4.   Brain:  Sylvian fissure subarachnoid hemorrhage is again found       < from: CT Brain Stroke Protocol (05.26.18 @ 08:31) >  IMPRESSION:   new subarachnoid hemorrhage within the RIGHT sylvian   fissure. Moderate periventricular white matter ischemia is noted.RIGHT   frontal and parietal scalp swelling is noted.

## 2018-05-28 NOTE — PROGRESS NOTE ADULT - SUBJECTIVE AND OBJECTIVE BOX
88 y/o female with a PMHx of Parkinson's, and Afib on coumadin (levels last checked last week, 2.2) presents to the ED s/p syncope and fall down steps today. Pt states she was going down the stairs, but before she reached the bottom of the stairs she felt herself blacking out. Pt knew she was going to fall so she tried to brace herself on the railing. Fell and hit her head on door, landed on R hip. Due to pt's h/o Parkinson's, she states she feels dizzy and lightheaded at baseline and felt dizzy before falling. At time of eval, pt c/o pain to head and slight, pain to R hip. No other acute complaints at time of eval.      05/26/18: Patient seen and examined. S/P code stroke this morning. She had repeat CT head: small SAH. Discussed with 2 daughters at bed side regarding management plan.   05/27/18: Patient seen and examined. Sitting in a chair, feels better today. Discussed with patient and family members at bed side regarding management and d/c plan.   05/28/18; patient seen and examined. No new complaints.       Vital Signs Last 24 Hrs  T(C): 36.1 (28 May 2018 04:00), Max: 36.2 (27 May 2018 19:36)  T(F): 97 (28 May 2018 04:00), Max: 97.1 (27 May 2018 19:36)  HR: 67 (28 May 2018 08:00) (56 - 83)  BP: 153/73 (28 May 2018 07:27) (99/51 - 199/66)  BP(mean): 94 (28 May 2018 07:27) (60 - 112)  RR: 16 (28 May 2018 08:00) (14 - 24)  SpO2: 99% (28 May 2018 08:00) (95% - 100%)    Physical exam:       · CARDIAC: Normal rate, regular rhythm.  Heart sounds S1, S2.  No murmurs, rubs or gallops.	  · RESPIRATORY: Breath sounds clear and equal bilaterally.	  · GASTROINTESTINAL: Abdomen soft, non-tender, no guarding.	  · NEUROLOGICAL: - - -	  · Level of Consciousness: alert	  · Speech: clear	  · SKIN: Skin normal color for race, warm, dry and intact. No evidence of rash.	              Labs:                                               12.8   7.82  )-----------( 198      ( 27 May 2018 05:41 )             36.9     27 May 2018 05:41    136    |  101    |  14     ----------------------------<  106    4.0     |  28     |  0.85     Ca    9.0        27 May 2018 05:41        PT/INR - ( 27 May 2018 05:41 )   PT: 13.5 sec;   INR: 1.24 ratio                  MEDICATIONS:    amantadine 100 milliGRAM(s) Oral <User Schedule>  atorvastatin 20 milliGRAM(s) Oral at bedtime  carbidopa/levodopa  25/100 0.5 Tablet(s) Oral <User Schedule>  cefTRIAXone Injectable. 1000 milliGRAM(s) IV Push every 24 hours  FLUoxetine 20 milliGRAM(s) Oral daily  latanoprost 0.005% Ophthalmic Solution 1 Drop(s) Both EYES at bedtime  levothyroxine 100 MICROGram(s) Oral daily  metoprolol tartrate 12.5 milliGRAM(s) Oral at bedtime  rasagiline Tablet 0.5 milliGRAM(s) Oral daily    MEDICATIONS  (PRN):  acetaminophen   Tablet 650 milliGRAM(s) Oral every 8 hours PRN For Temp greater than 38 C (100.4 F)  acetaminophen   Tablet. 650 milliGRAM(s) Oral every 8 hours PRN Mild Pain (1 - 3)        Assessment and Plan:   Assessment:  · Assessment		  86 yo female presented after syncope.    A/P:    1.  Syncope possibly due to Parkinson  disease  s/p Fall  Scalp hematoma  Fall precautions.  PT eval pending  Dr Velázquez follow up appreciated.    2. Small SAH due to fall on coumadin  Confusion- metabolic encephalopathy: resolved  Doubt acute CVA.  Dr Mcfarland consult and follow up appreciated.  S/P Kcentra  Hold coumadin.  Repeat CT head: unchanged     3.   Parkinson's disease   -continue Home medications    4.  HTN  -follow BP and adjust medication as needed    5.   Hypothyroidism  -Continue levothyroxine     6.  UTI  -Treated with IV rocephin  -follow urine cx: no growth    7.  Hypokalemia-replaced    8.   Hyponatremia-resolved.    Possible will need rehab placement  PT eval pending

## 2018-05-29 ENCOUNTER — TRANSCRIPTION ENCOUNTER (OUTPATIENT)
Age: 83
End: 2018-05-29

## 2018-05-29 VITALS — DIASTOLIC BLOOD PRESSURE: 58 MMHG | SYSTOLIC BLOOD PRESSURE: 138 MMHG | HEART RATE: 87 BPM

## 2018-05-29 PROCEDURE — 99233 SBSQ HOSP IP/OBS HIGH 50: CPT

## 2018-05-29 PROCEDURE — 70450 CT HEAD/BRAIN W/O DYE: CPT | Mod: 26

## 2018-05-29 RX ORDER — WARFARIN SODIUM 2.5 MG/1
0.5 TABLET ORAL
Qty: 0 | Refills: 0 | COMMUNITY

## 2018-05-29 RX ORDER — WARFARIN SODIUM 2.5 MG/1
1 TABLET ORAL
Qty: 0 | Refills: 0 | COMMUNITY

## 2018-05-29 RX ORDER — ACETAMINOPHEN 500 MG
2 TABLET ORAL
Qty: 0 | Refills: 0 | COMMUNITY
Start: 2018-05-29

## 2018-05-29 RX ORDER — AMLODIPINE BESYLATE 2.5 MG/1
1 TABLET ORAL
Qty: 0 | Refills: 0 | COMMUNITY
Start: 2018-05-29

## 2018-05-29 RX ADMIN — Medication 100 MICROGRAM(S): at 05:36

## 2018-05-29 RX ADMIN — CARBIDOPA AND LEVODOPA 0.5 TABLET(S): 25; 100 TABLET ORAL at 09:00

## 2018-05-29 RX ADMIN — CARBIDOPA AND LEVODOPA 0.5 TABLET(S): 25; 100 TABLET ORAL at 12:20

## 2018-05-29 RX ADMIN — Medication 20 MILLIGRAM(S): at 12:20

## 2018-05-29 RX ADMIN — Medication 100 MILLIGRAM(S): at 09:00

## 2018-05-29 RX ADMIN — Medication 100 MILLIGRAM(S): at 12:19

## 2018-05-29 RX ADMIN — RASAGILINE 0.5 MILLIGRAM(S): 0.5 TABLET ORAL at 12:19

## 2018-05-29 RX ADMIN — AMLODIPINE BESYLATE 2.5 MILLIGRAM(S): 2.5 TABLET ORAL at 09:00

## 2018-05-29 NOTE — DISCHARGE NOTE ADULT - CARE PROVIDERS DIRECT ADDRESSES
,DirectAddress_Unknown,DirectAddress_Unknown,[email or web address]

## 2018-05-29 NOTE — DISCHARGE NOTE ADULT - MEDICATION SUMMARY - MEDICATIONS TO TAKE
I will START or STAY ON the medications listed below when I get home from the hospital:    acetaminophen 325 mg oral tablet  -- 2 tab(s) by mouth every 8 hours, As needed, For Temp greater than 38 C (100.4 F)  -- Indication: For fever    acetaminophen 325 mg oral tablet  -- 2 tab(s) by mouth every 8 hours, As needed, Mild Pain (1 - 3)  -- Indication: For Pain    FLUoxetine 20 mg oral capsule  -- 1 cap(s) by mouth once a day  -- Indication: For Anxiety    Lipitor 20 mg oral tablet  -- 1 tab(s) by mouth once a day (at bedtime)  -- Indication: For HLD    carbidopa-levodopa 25 mg-100 mg oral tablet  -- 0.5 tab(s) by mouth 3 times a day at 8 am , 12 pm, and 4 pm   -- Indication: For Parkinson's disease    rasagiline 0.5 mg oral tablet  -- 1 tab(s) by mouth once a day  -- Indication: For Parkinson's disease    amantadine 100 mg oral tablet  -- 1 tab(s) by mouth 2 times a day at 8 am and 12 pm   -- Indication: For Parkinson's disease    metoprolol tartrate 25 mg oral tablet  -- 0.5 tab(s) by mouth once a day (at bedtime)  -- Indication: For HTN    amLODIPine 2.5 mg oral tablet  -- 1 tab(s) by mouth once a day  -- Indication: For HTN    latanoprost 0.005% ophthalmic solution  -- 1 drop(s) in each eye once a day (in the evening)  -- Indication: For eye drop    levothyroxine 100 mcg (0.1 mg) oral tablet  -- 1 tab(s) by mouth once a day  -- Indication: For hypothyroidism

## 2018-05-29 NOTE — DISCHARGE NOTE ADULT - PATIENT PORTAL LINK FT
You can access the [facility] Portal, offered by [facility], by registering with the following website: [email or web address]

## 2018-05-29 NOTE — PROGRESS NOTE ADULT - PROVIDER SPECIALTY LIST ADULT
Cardiology
Critical Care
Hospitalist
Hospitalist
Neurosurgery
Hospitalist
Cardiology

## 2018-05-29 NOTE — DISCHARGE NOTE ADULT - CARE PLAN
Principal Discharge DX:	Syncope, unspecified syncope type  Goal:	to prevent further fall  Assessment and plan of treatment:	Repeat CT head in 1 week and follow up with Dr Mcfarland and Dr Millan

## 2018-05-29 NOTE — DISCHARGE NOTE ADULT - HOSPITAL COURSE
88 y/o female with a PMHx of Parkinson's, and Afib on coumadin (levels last checked last week, 2.2) presents to the ED s/p syncope and fall down steps today. Pt states she was going down the stairs, but before she reached the bottom of the stairs she felt herself blacking out. Pt knew she was going to fall so she tried to brace herself on the railing. Fell and hit her head on door, landed on R hip. Due to pt's h/o Parkinson's, she states she feels dizzy and lightheaded at baseline and felt dizzy before falling. At time of eval, pt c/o pain to head and slight, pain to R hip. No other acute complaints at time of eval.      05/26/18: Patient seen and examined. S/P code stroke this morning. She had repeat CT head: small SAH. Discussed with 2 daughters at bed side regarding management plan.   05/27/18: Patient seen and examined. Sitting in a chair, feels better today. Discussed with patient and family members at bed side regarding management and d/c plan.   05/28/18; patient seen and examined. No new complaints.   05/29/18: patient is sitting in a chair. No new complaints.     Vital Signs Last 24 Hrs  T(C): 36.2 (29 May 2018 05:22), Max: 36.4 (29 May 2018 00:29)  T(F): 97.1 (29 May 2018 05:22), Max: 97.5 (29 May 2018 00:29)  HR: 67 (29 May 2018 06:00) (61 - 88)  BP: 140/60 (29 May 2018 06:00) (90/37 - 174/69)  BP(mean): 77 (29 May 2018 06:00) (48 - 106)  RR: 17 (29 May 2018 06:00) (15 - 20)  SpO2: 98% (29 May 2018 06:00) (96% - 100%)    Physical exam:       · CARDIAC: Normal rate, regular rhythm.  Heart sounds S1, S2.  No murmurs, rubs or gallops.	  · RESPIRATORY: Breath sounds clear and equal bilaterally.	  · GASTROINTESTINAL: Abdomen soft, non-tender, no guarding.	  · NEUROLOGICAL: - - -	  · Level of Consciousness: alert	  · Speech: clear	  · SKIN: Skin normal color for race, warm, dry and intact. No evidence of rash.	        A/P:    1.  Syncope possibly due to Parkinson  disease  s/p Fall  Scalp hematoma  Fall precautions.  PT eval appreciated  Dr Velázquez follow up appreciated.    2. Small SAH due to fall on coumadin  Confusion- metabolic encephalopathy: resolved  No acute CVA.  Dr Mcfarland consult and follow up appreciated.  S/P Kcentra  Hold coumadin.  Repeat CT head: unchanged.  Dr JABIER Colbert follow up appreciated.  Repeat CT head in 1 week time and follow up with Dr Mcfarland      3.   Parkinson's disease   -continue Home medications    4.  HTN  -follow BP and adjust medication as needed    5.   Hypothyroidism  -Continue levothyroxine     6.  UTI  -Treated with IV rocephin  -urine cx: no growth    7.  Hypokalemia-replaced    8.   Hyponatremia-resolved.      D/C to rehab  Her PMD aware of the discharge.  Spent more than 30 minutes to prepare the discharge.

## 2018-05-29 NOTE — PROGRESS NOTE ADULT - ASSESSMENT
87 F with parkinsons , labile BP with syncopal episode- and findning of ICH on followu p inaging      no evidence for ACS,   normal ventricular function on echo    ICH- seen by neuro surgery, reversed anticoagulation, CTA reveals high grade R carotid stenosis at bulb and ICA, no aneurysms or  malformations , neurology evaluation pending     continue lipitor( will increase dose) , and metoprolol ---- recommend asa 81 when stable from neuro perspective     will add low dose norvasc to treat blood pressure which is currently elevated     supportive care- keep well hydrated     recommend  rehab after dischaerge
87 F with parkinsons , labile BP with syncopal episode- and findning of ICH on followu p inaging      no evidence for ACS,   normal ventricular function     ICH- seen by neuro surgery, will reverse anticoagulation, check CTA  brain, neurology evalaution    continue lipitor, and metoprolol     supportive care
87 F with parkinsons , labile BP with syncopal episode- and findning of ICH on followu p inaging      no evidence for ACS,   normal ventricular function on echo    ICH- seen by neuro surgery, reversed anticoagulation, CTA reveals high grade R carotid stenosis at bulb and ICA, no aneurysms or  malformations , as per neurology, stable to DC, recommed repeat CT scan in 1 week and if stable or resolving can resume coumadin    continue lipitor( will increase dose) , and metoprolol ---- recommend asa 81 when stable from neuro perspective     will add low dose norvasc to treat blood pressure  when elevated     supportive care- keep well hydrated     recommend  rehab after discharge- no cardiac contraindication to DC
87 F with parkinsons , labile BP with syncopal episode- and findning of ICH on followu p inaging      no evidence for ACS,   normal ventricular function on echo    ICH- seen by neuro surgery, will reverse anticoagulation, CTA reveals high grade R carotid stenosis at bulb and ICA, no aneurysms or  malformations , neurology evaluation    continue lipitor( will increase dose) , and metoprolol ---- recommend asa 81 when stable from neuro perspective     supportive care
Assessment and Recommendation:   · Assessment		  86 y/o female with a PMHx of Parkinson's, and Afib on coumadin (levels last checked last week, 2.2) presents to the ED s/p syncope and fall down steps today. Pt states she was going down the stairs, but before she reached the bottom of the stairs she felt herself blacking out. Pt knew she was going to fall so she tried to brace herself on the railing. Fell and hit her head on door, landed on R hip. Due to pt's h/o Parkinson's, she states she feels dizzy and lightheaded at baseline and felt dizzy before falling. At time of eval, pt c/o pain to head and slight, pain to R hip.    Syncope possibly due to Parkinson  disease/ Orthostatic Hypotension with recurrent falls .   Small SAH due to fall on coumadin  Confusion- metabolic encephalopathy    resolved  Repeat CT stable.  Hold Coumadin till Next week.  F/u CT .  Ok for D/c planning for rehab.  Discussed with Pt, daughter and dR. Velázquez.
IMP:    New small R traumatic SAH in [place] fissure--non focal on exam--slightly elevated INR at 1.7    Suggest:    NSGY eval  Tx 2u FFP  Neuro checks  BP monitoring--keep SBP < 160    Given neuro exam, will observe in CICU for now pending NSGY eval--this and above d/w dr yepez via phone at 0900

## 2018-05-29 NOTE — DISCHARGE NOTE ADULT - CARE PROVIDER_API CALL
Vernon Velázquez), Cardiovascular Disease; Internal Medicine; Nuclear Cardiology  [address]  [place] [address]  Phone: [phone]  Fax: [phone]    Salvador Mcfarland), Neurological Surgery  [address] [place] [address]  Phone: [phone]  Fax: [phone]    Rica Millan), Neurology  [address] [place] [address]  Phone: [phone]  Fax: [phone]

## 2018-05-29 NOTE — PROGRESS NOTE ADULT - SUBJECTIVE AND OBJECTIVE BOX
HPI:  88 y/o female with a PMHx of Parkinson's, and Afib on coumadin (levels last checked last week, 2.2) presents to the ED s/p syncope and fall down steps on 5/25/18. Pt states she was going down the stairs, but before she reached the bottom of the stairs she felt herself blacking out. Pt knew she was going to fall so she tried to brace herself on the railing. Fell and hit her head on door, landed on R hip. Due to pt's h/o Parkinson's, she states she feels dizzy and lightheaded at baseline and felt dizzy before falling. At time of eval, pt c/o pain to head and slight, pain to R hip. No other acute complaints at time of eval.  Multiple echymotic areas on face and neck. Seen by NS for SAH seen on CT brain . Mild confusion noted Yesterday morning which cleared today. H/o PD and being followed by Dr. Millan recently added amantadine twice  a day. As per Pt's daughter who is at bed side pt doesn't use any assistive devices.     5/29/18 : Pt awake, alert , offers no c/o. No headaches, dizziness,    MEDICATIONS  (STANDING):  amantadine 100 milliGRAM(s) Oral <User Schedule>  amLODIPine   Tablet 2.5 milliGRAM(s) Oral daily  atorvastatin 20 milliGRAM(s) Oral at bedtime  carbidopa/levodopa  25/100 0.5 Tablet(s) Oral <User Schedule>  FLUoxetine 20 milliGRAM(s) Oral daily  latanoprost 0.005% Ophthalmic Solution 1 Drop(s) Both EYES at bedtime  levothyroxine 100 MICROGram(s) Oral daily  metoprolol tartrate 12.5 milliGRAM(s) Oral at bedtime  rasagiline Tablet 0.5 milliGRAM(s) Oral daily      Vital Signs Last 24 Hrs  T(C): 36.2 (29 May 2018 05:22), Max: 36.4 (29 May 2018 00:29)  T(F): 97.1 (29 May 2018 05:22), Max: 97.5 (29 May 2018 00:29)  HR: 67 (29 May 2018 06:00) (61 - 88)  BP: 140/60 (29 May 2018 06:00) (90/37 - 174/69)  BP(mean): 77 (29 May 2018 06:00) (48 - 106)  RR: 17 (29 May 2018 06:00) (15 - 20)  SpO2: 98% (29 May 2018 06:00) (96% - 100%)                  Radiology report:  - CT Head  - MRI brain  - MRA brain/carotids  - EEG HPI:  88 y/o female with a PMHx of Parkinson's, and Afib on coumadin (levels last checked last week, 2.2) presents to the ED s/p syncope and fall down steps on 5/25/18. Pt states she was going down the stairs, but before she reached the bottom of the stairs she felt herself blacking out. Pt knew she was going to fall so she tried to brace herself on the railing. Fell and hit her head on door, landed on R hip. Due to pt's h/o Parkinson's, she states she feels dizzy and lightheaded at baseline and felt dizzy before falling. At time of eval, pt c/o pain to head and slight, pain to R hip. No other acute complaints at time of eval.  Multiple echymotic areas on face and neck. Seen by NS for SAH seen on CT brain . Mild confusion noted Yesterday morning which cleared today. H/o PD and being followed by Dr. Millan recently added amantadine twice  a day. As per Pt's daughter who is at bed side pt doesn't use any assistive devices.     5/29/18 : Pt awake, alert , offers no c/o. No headaches, dizziness, No focal  weakness. OOB sitting in chair, feeling light headed.     MEDICATIONS  (STANDING):  amantadine 100 milliGRAM(s) Oral <User Schedule>  amLODIPine   Tablet 2.5 milliGRAM(s) Oral daily  atorvastatin 20 milliGRAM(s) Oral at bedtime  carbidopa/levodopa  25/100 0.5 Tablet(s) Oral <User Schedule>  FLUoxetine 20 milliGRAM(s) Oral daily  latanoprost 0.005% Ophthalmic Solution 1 Drop(s) Both EYES at bedtime  levothyroxine 100 MICROGram(s) Oral daily  metoprolol tartrate 12.5 milliGRAM(s) Oral at bedtime  rasagiline Tablet 0.5 milliGRAM(s) Oral daily      Vital Signs Last 24 Hrs  T(C): 36.2 (29 May 2018 05:22), Max: 36.4 (29 May 2018 00:29)  T(F): 97.1 (29 May 2018 05:22), Max: 97.5 (29 May 2018 00:29)  HR: 67 (29 May 2018 06:00) (61 - 88)  BP: 140/60 (29 May 2018 06:00) (90/37 - 174/69)  BP(mean): 77 (29 May 2018 06:00) (48 - 106)  RR: 17 (29 May 2018 06:00) (15 - 20)  SpO2: 98% (29 May 2018 06:00) (96% - 100%)    Neurological exam:  HF: A x O x 3. Appropriately interactive, normal affect. Speech fluent, No Aphasia.   CN: KERRIE, EOMI, VFF, facial sensation normal, no NLFD, tongue midline, gag intact  Motor: No pronator drift, Strength 5/5 in all 4 ext, bilat rest tremors with mild cogwheel rigidity.    Sens: Intact to light touch.  Reflexes: Symmetric and normal . BJ 2+, BR 2+, KJ 1+, AJ 1+, downgoing toes b/l  Coord:   bilat tremors  Gait/Balance: Cannot test          Radiology report:    - CT Head: 5/27/18  < from: CT Head No Cont (05.27.18 @ 12:29) >  IMPRESSION:   Unchanged subarachnoid hemorrhage in the RIGHT sylvian   fissure.     < from: CT Angio Head w/ IV Cont (05.26.18 @ 11:29) >  IMPRESSION:          1.   Right carotid system:  high-grade (70-90%) stenosis at the   distal RIGHT common carotid and proximal RIGHT internal carotid artery.           2.   Left carotid system:  mild (less than 30%) stenosis at the   origin of the LEFT internal carotid artery.              3.   Intracranial circulation:  No significant vascular lesion.             4.   Brain:  Sylvian fissure subarachnoid hemorrhage is again found       < from: CT Brain Stroke Protocol (05.26.18 @ 08:31) >  IMPRESSION:   new subarachnoid hemorrhage within the RIGHT sylvian   fissure. Moderate periventricular white matter ischemia is noted.RIGHT   frontal and parietal scalp swelling is noted.

## 2018-05-29 NOTE — PROGRESS NOTE ADULT - SUBJECTIVE AND OBJECTIVE BOX
CHIEF COMPLAINT: Patient is a 87y old  Female who presents with a chief complaint of syncope and fall (25 May 2018 02:00)      HPI:  88 y/o female with a PMHx of Parkinson's, and Afib on coumadin (levels last checked last week, 2.2) presents to the ED s/p syncope and fall down steps today. Pt states she was going down the stairs, but before she reached the bottom of the stairs she felt herself blacking out. Pt knew she was going to fall so she tried to brace herself on the railing. Fell and hit her head on door, landed on R hip. Due to pt's h/o Parkinson's, she states she feels dizzy and lightheaded at baseline and felt dizzy before falling. At time of eval, pt c/o pain to head and slight, pain to R hip. No other acute complaints at time of eval. (25 May 2018 02:00)    5/26- code stroke called this AM for confusion- finding of ICH    5/28 No acute issues overnight , labile blood pressure    5/29 repeat CT scan stable bleed       PMHx: PAST MEDICAL & SURGICAL HISTORY:  Parkinson's disease  Afib  No significant past surgical history        Allergies: Allergies    amoxicillin (Unknown)  contrast (Unknown)    Intolerances          REVIEW OF SYSTEMS:    CONSTITUTIONAL: No weakness, fevers or chills  EYES/ENT: No visual changes;  No vertigo or throat pain   NECK: No pain or stiffness  RESPIRATORY: No cough, wheezing, hemoptysis; No shortness of breath  CARDIOVASCULAR: No chest pain or palpitations  GASTROINTESTINAL: No abdominal or epigastric pain. No nausea, vomiting, or hematemesis; No diarrhea or constipation. No melena or hematochezia.  GENITOURINARY: No dysuria, frequency or hematuria  NEUROLOGICAL: No numbness or weakness  SKIN: No itching, burning, rashes, or lesions   All other review of systems is negative unless indicated above    ICU Vital Signs Last 24 Hrs  T(C): 36.2 (29 May 2018 05:22), Max: 36.4 (29 May 2018 00:29)  T(F): 97.1 (29 May 2018 05:22), Max: 97.5 (29 May 2018 00:29)  HR: 67 (29 May 2018 06:00) (61 - 88)  BP: 140/60 (29 May 2018 06:00) (90/37 - 174/69)  BP(mean): 77 (29 May 2018 06:00) (48 - 106)  ABP: --  ABP(mean): --  RR: 17 (29 May 2018 06:00) (15 - 20)  SpO2: 98% (29 May 2018 06:00) (96% - 100%)        PHYSICAL EXAM:   Constitutional: NAD, awake and alert,  right periorbital ecchymosis  HEENT: PERR, EOMI, Normal Hearing, MMM  Neck: Soft and supple, No LAD, No JVD  Respiratory: Breath sounds are clear bilaterally, No wheezing, rales or rhonchi  Cardiovascular: S1 and S2, regular rate and rhythm, no Murmurs, gallops or rubs  Gastrointestinal: Bowel Sounds present, soft, nontender, nondistended, no guarding, no rebound  Extremities: No peripheral edema  Vascular: 2+ peripheral pulses  Neurological: A/O x 3, no focal deficits  Musculoskeletal: 5/5 strength b/l upper and lower extremities  Skin: No rashes      MEDICATIONS  (STANDING):  amantadine 100 milliGRAM(s) Oral <User Schedule>  amLODIPine   Tablet 2.5 milliGRAM(s) Oral daily  atorvastatin 20 milliGRAM(s) Oral at bedtime  carbidopa/levodopa  25/100 0.5 Tablet(s) Oral <User Schedule>  FLUoxetine 20 milliGRAM(s) Oral daily  latanoprost 0.005% Ophthalmic Solution 1 Drop(s) Both EYES at bedtime  levothyroxine 100 MICROGram(s) Oral daily  metoprolol tartrate 12.5 milliGRAM(s) Oral at bedtime  rasagiline Tablet 0.5 milliGRAM(s) Oral daily            RADIOLOGY: CT head < from: CT Head No Cont (05.24.18 @ 19:33) >  IMPRESSION: Moderate to large right frontal scalp hematoma.  Moderate   chronic microvascular changes without evidence of an acute transcortical   infarction or hemorrhage. MR is a more sensitive imaging modality for the   evaluation of an acute infarction.     EMILIA TSAI   This document has been electronically signed. May 24 2018  7:37PM    < end of copied text >      EKG: SR, RBBB inferior and lateral T wave changes     Telemetry:  SR    ECHO: < from: Transthoracic Echocardiogram (05.25.18 @ 08:37) >   Summary     The left ventricle is normal in size, wall motion and contractility.   Mild concentric left ventricular hypertrophy is present.   Estimated left ventricular ejection fraction is 60-65 %.   Normal aortic valve structure and function.   Trace aortic regurgitation is present.   Normal appearing mitral valve structure and function.   EA reversal of the mitral inflow consistent with reduced compliance of   the   left ventricle.   Mild (1+) mitral regurgitation is present.   Normal appearing tricuspid valve structure and function.   Mild (1+) tricuspid valve regurgitation is present.   Mild pulmonary hypertension.     Signature    < end of copied text >    CT< from: CT Angio Head w/ IV Cont (05.26.18 @ 11:29) >      IMPRESSION:          1.   Right carotid system:  high-grade (70-90%) stenosis at the   distal RIGHT common carotid and proximal RIGHT internal carotid artery.           2.   Left carotid system:  mild (less than 30%) stenosis at the   origin of the LEFT internal carotid artery.              3.   Intracranial circulation:  No significant vascular lesion.             4.   Brain:  Sylvian fissure subarachnoid hemorrhage is again found         < end of copied text >

## 2018-05-29 NOTE — PROGRESS NOTE ADULT - SUBJECTIVE AND OBJECTIVE BOX
NS PA Note    Pt seen bedside. C/O sinus headache overnight, resolved by this morning. No dizziness, change in vision or n/v. Reports generalized weakness, but moving everything. Patient also states she hasn't been up to do any walking.   VSS, afebrile  Neuro- Awake, alert, orientedx3  periorbital ecchymosis  NAD, speech clear and appropriate  follows commands  no drift  TAMEZ well in bed  essentially non-focal  multiple areas of ecchymosis on the body  CTH 5/27- stable  CTA 5/26- high grade stenosis R carotid bulb  Plan:  NS stable  Recommend CTH in 1 week and follow up with Dr. Mcfarland in the office to consider resuming Coumadin.   Dispo planning, consider PT/OOB as tolerated, rehab placement as necessary  Discussed with Dr. Mcfarland. Reconsult or call with questions.

## 2018-05-29 NOTE — DISCHARGE NOTE ADULT - MEDICATION SUMMARY - MEDICATIONS TO STOP TAKING
I will STOP taking the medications listed below when I get home from the hospital:    warfarin 1 mg oral tablet  -- 1 tab(s) by mouth 6 times a week (DAILY EXCEPT SUNDAY)    warfarin 1 mg oral tablet  -- 0.5 tab(s) by mouth once a week SUNDAY

## 2018-05-31 DIAGNOSIS — R29.6 REPEATED FALLS: ICD-10-CM

## 2018-05-31 DIAGNOSIS — G93.41 METABOLIC ENCEPHALOPATHY: ICD-10-CM

## 2018-05-31 DIAGNOSIS — S06.6X9A TRAUMATIC SUBARACHNOID HEMORRHAGE WITH LOSS OF CONSCIOUSNESS OF UNSPECIFIED DURATION, INITIAL ENCOUNTER: ICD-10-CM

## 2018-05-31 DIAGNOSIS — E87.6 HYPOKALEMIA: ICD-10-CM

## 2018-05-31 DIAGNOSIS — S00.03XA CONTUSION OF SCALP, INITIAL ENCOUNTER: ICD-10-CM

## 2018-05-31 DIAGNOSIS — N39.0 URINARY TRACT INFECTION, SITE NOT SPECIFIED: ICD-10-CM

## 2018-05-31 DIAGNOSIS — Z79.01 LONG TERM (CURRENT) USE OF ANTICOAGULANTS: ICD-10-CM
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
## 2018-05-31 DIAGNOSIS — I48.2 CHRONIC ATRIAL FIBRILLATION: ICD-10-CM

## 2018-05-31 DIAGNOSIS — Z79.899 OTHER LONG TERM (CURRENT) DRUG THERAPY: ICD-10-CM

## 2018-05-31 DIAGNOSIS — E87.1 HYPO-OSMOLALITY AND HYPONATREMIA: ICD-10-CM
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
## 2018-05-31 DIAGNOSIS — G20 PARKINSON'S DISEASE: ICD-10-CM

## 2018-05-31 DIAGNOSIS — I95.1 ORTHOSTATIC HYPOTENSION: ICD-10-CM

## 2018-06-05 PROBLEM — I48.91 UNSPECIFIED ATRIAL FIBRILLATION: Chronic | Status: ACTIVE | Noted: 2018-05-24

## 2018-06-05 PROBLEM — G20 PARKINSON'S DISEASE: Chronic | Status: ACTIVE | Noted: 2018-05-24

## 2018-06-06 ENCOUNTER — OUTPATIENT (OUTPATIENT)
Dept: OUTPATIENT SERVICES | Facility: HOSPITAL | Age: 83
LOS: 1 days | Discharge: HOME | End: 2018-06-06
Payer: MEDICARE

## 2018-06-06 DIAGNOSIS — E78.5 HYPERLIPIDEMIA, UNSPECIFIED: ICD-10-CM

## 2018-06-06 DIAGNOSIS — K59.00 CONSTIPATION, UNSPECIFIED: ICD-10-CM

## 2018-06-06 DIAGNOSIS — I48.91 UNSPECIFIED ATRIAL FIBRILLATION: ICD-10-CM

## 2018-06-06 DIAGNOSIS — Z87.440 PERSONAL HISTORY OF URINARY (TRACT) INFECTIONS: ICD-10-CM

## 2018-06-06 DIAGNOSIS — S06.6X9D TRAUMATIC SUBARACHNOID HEMORRHAGE WITH LOSS OF CONSCIOUSNESS OF UNSPECIFIED DURATION, SUBSEQUENT ENCOUNTER: ICD-10-CM

## 2018-06-06 DIAGNOSIS — H40.9 UNSPECIFIED GLAUCOMA: ICD-10-CM

## 2018-06-06 DIAGNOSIS — R52 PAIN, UNSPECIFIED: ICD-10-CM

## 2018-06-06 DIAGNOSIS — Z91.81 HISTORY OF FALLING: ICD-10-CM

## 2018-06-06 DIAGNOSIS — I10 ESSENTIAL (PRIMARY) HYPERTENSION: ICD-10-CM

## 2018-06-06 DIAGNOSIS — E03.9 HYPOTHYROIDISM, UNSPECIFIED: ICD-10-CM

## 2018-06-06 DIAGNOSIS — Z11.1 ENCOUNTER FOR SCREENING FOR RESPIRATORY TUBERCULOSIS: ICD-10-CM

## 2018-06-06 DIAGNOSIS — R55 SYNCOPE AND COLLAPSE: ICD-10-CM

## 2018-06-06 DIAGNOSIS — Z79.899 OTHER LONG TERM (CURRENT) DRUG THERAPY: ICD-10-CM

## 2018-06-06 DIAGNOSIS — G20 PARKINSON'S DISEASE: ICD-10-CM

## 2018-06-06 DIAGNOSIS — F41.9 ANXIETY DISORDER, UNSPECIFIED: ICD-10-CM

## 2018-06-06 PROCEDURE — 70450 CT HEAD/BRAIN W/O DYE: CPT | Mod: 26

## 2018-08-13 ENCOUNTER — RECORD ABSTRACTING (OUTPATIENT)
Age: 83
End: 2018-08-13

## 2018-08-13 DIAGNOSIS — Z86.69 PERSONAL HISTORY OF OTHER DISEASES OF THE NERVOUS SYSTEM AND SENSE ORGANS: ICD-10-CM

## 2018-08-13 DIAGNOSIS — Z86.79 PERSONAL HISTORY OF OTHER DISEASES OF THE CIRCULATORY SYSTEM: ICD-10-CM

## 2018-08-13 RX ORDER — METOPROLOL TARTRATE 25 MG/1
25 TABLET, FILM COATED ORAL
Refills: 0 | Status: ACTIVE | COMMUNITY

## 2018-08-13 RX ORDER — ATORVASTATIN CALCIUM 20 MG/1
20 TABLET, FILM COATED ORAL
Refills: 0 | Status: ACTIVE | COMMUNITY

## 2018-08-13 RX ORDER — LEVOTHYROXINE SODIUM 0.1 MG/1
100 TABLET ORAL
Refills: 0 | Status: ACTIVE | COMMUNITY

## 2018-08-14 ENCOUNTER — APPOINTMENT (OUTPATIENT)
Dept: NEUROLOGY | Facility: CLINIC | Age: 83
End: 2018-08-14
Payer: MEDICARE

## 2018-08-14 VITALS
HEART RATE: 81 BPM | SYSTOLIC BLOOD PRESSURE: 128 MMHG | DIASTOLIC BLOOD PRESSURE: 78 MMHG | WEIGHT: 120 LBS | OXYGEN SATURATION: 95 % | HEIGHT: 65 IN | BODY MASS INDEX: 19.99 KG/M2

## 2018-08-14 DIAGNOSIS — S06.6X9A TRAUMATIC SUBARACHNOID HEMORRHAGE WITH LOSS OF CONSCIOUSNESS OF UNSPECIFIED DURATION, INITIAL ENCOUNTER: ICD-10-CM

## 2018-08-14 DIAGNOSIS — R55 SYNCOPE AND COLLAPSE: ICD-10-CM

## 2018-08-14 DIAGNOSIS — F07.81 POSTCONCUSSIONAL SYNDROME: ICD-10-CM

## 2018-08-14 PROCEDURE — 99215 OFFICE O/P EST HI 40 MIN: CPT

## 2018-08-14 RX ORDER — AMANTADINE HYDROCHLORIDE 100 MG/1
100 CAPSULE ORAL
Refills: 0 | Status: DISCONTINUED | COMMUNITY
End: 2018-08-14

## 2018-08-19 ENCOUNTER — FORM ENCOUNTER (OUTPATIENT)
Age: 83
End: 2018-08-19

## 2018-08-20 ENCOUNTER — APPOINTMENT (OUTPATIENT)
Dept: MRI IMAGING | Facility: CLINIC | Age: 83
End: 2018-08-20
Payer: MEDICARE

## 2018-08-20 ENCOUNTER — OUTPATIENT (OUTPATIENT)
Dept: OUTPATIENT SERVICES | Facility: HOSPITAL | Age: 83
LOS: 1 days | End: 2018-08-20
Payer: MEDICARE

## 2018-08-20 DIAGNOSIS — Z00.8 ENCOUNTER FOR OTHER GENERAL EXAMINATION: ICD-10-CM

## 2018-08-20 PROCEDURE — 70551 MRI BRAIN STEM W/O DYE: CPT

## 2018-08-20 PROCEDURE — 70551 MRI BRAIN STEM W/O DYE: CPT | Mod: 26

## 2018-09-19 ENCOUNTER — APPOINTMENT (OUTPATIENT)
Dept: NEUROLOGY | Facility: CLINIC | Age: 83
End: 2018-09-19
Payer: MEDICARE

## 2018-09-19 VITALS
SYSTOLIC BLOOD PRESSURE: 104 MMHG | WEIGHT: 120 LBS | HEART RATE: 68 BPM | BODY MASS INDEX: 19.99 KG/M2 | DIASTOLIC BLOOD PRESSURE: 58 MMHG | HEIGHT: 65 IN

## 2018-09-19 DIAGNOSIS — S06.9X9S UNSPECIFIED INTRACRANIAL INJURY WITH LOSS OF CONSCIOUSNESS OF UNSPECIFIED DURATION, SEQUELA: ICD-10-CM

## 2018-09-19 PROCEDURE — 99214 OFFICE O/P EST MOD 30 MIN: CPT

## 2018-09-19 RX ORDER — AMLODIPINE BESYLATE 2.5 MG/1
2.5 TABLET ORAL
Refills: 0 | Status: DISCONTINUED | COMMUNITY
End: 2018-09-19

## 2019-07-02 ENCOUNTER — APPOINTMENT (OUTPATIENT)
Dept: DERMATOLOGY | Facility: CLINIC | Age: 84
End: 2019-07-02

## 2019-09-05 ENCOUNTER — APPOINTMENT (OUTPATIENT)
Dept: NEUROLOGY | Facility: CLINIC | Age: 84
End: 2019-09-05

## 2019-09-06 ENCOUNTER — EMERGENCY (EMERGENCY)
Facility: HOSPITAL | Age: 84
LOS: 0 days | Discharge: HOME | End: 2019-09-06
Attending: EMERGENCY MEDICINE
Payer: MEDICARE

## 2019-09-06 VITALS
RESPIRATION RATE: 18 BRPM | SYSTOLIC BLOOD PRESSURE: 162 MMHG | HEART RATE: 84 BPM | TEMPERATURE: 98 F | DIASTOLIC BLOOD PRESSURE: 83 MMHG | OXYGEN SATURATION: 100 %

## 2019-09-06 VITALS
DIASTOLIC BLOOD PRESSURE: 97 MMHG | SYSTOLIC BLOOD PRESSURE: 174 MMHG | HEIGHT: 66 IN | HEART RATE: 76 BPM | OXYGEN SATURATION: 100 % | WEIGHT: 125 LBS | RESPIRATION RATE: 18 BRPM

## 2019-09-06 DIAGNOSIS — S01.81XA LACERATION WITHOUT FOREIGN BODY OF OTHER PART OF HEAD, INITIAL ENCOUNTER: ICD-10-CM

## 2019-09-06 DIAGNOSIS — S09.90XA UNSPECIFIED INJURY OF HEAD, INITIAL ENCOUNTER: ICD-10-CM

## 2019-09-06 DIAGNOSIS — G20 PARKINSON'S DISEASE: ICD-10-CM
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
## 2019-09-06 DIAGNOSIS — Z87.820 PERSONAL HISTORY OF TRAUMATIC BRAIN INJURY: ICD-10-CM

## 2019-09-06 DIAGNOSIS — S00.93XA CONTUSION OF UNSPECIFIED PART OF HEAD, INITIAL ENCOUNTER: ICD-10-CM

## 2019-09-06 DIAGNOSIS — I48.91 UNSPECIFIED ATRIAL FIBRILLATION: ICD-10-CM
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
## 2019-09-06 DIAGNOSIS — Z79.899 OTHER LONG TERM (CURRENT) DRUG THERAPY: ICD-10-CM
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
## 2019-09-06 PROCEDURE — 71045 X-RAY EXAM CHEST 1 VIEW: CPT | Mod: 26

## 2019-09-06 PROCEDURE — 72170 X-RAY EXAM OF PELVIS: CPT

## 2019-09-06 PROCEDURE — 72125 CT NECK SPINE W/O DYE: CPT

## 2019-09-06 PROCEDURE — 70450 CT HEAD/BRAIN W/O DYE: CPT

## 2019-09-06 PROCEDURE — 71045 X-RAY EXAM CHEST 1 VIEW: CPT

## 2019-09-06 PROCEDURE — 12011 RPR F/E/E/N/L/M 2.5 CM/<: CPT

## 2019-09-06 PROCEDURE — 93005 ELECTROCARDIOGRAM TRACING: CPT | Mod: XU

## 2019-09-06 PROCEDURE — 72170 X-RAY EXAM OF PELVIS: CPT | Mod: 26

## 2019-09-06 PROCEDURE — 93010 ELECTROCARDIOGRAM REPORT: CPT

## 2019-09-06 PROCEDURE — 72125 CT NECK SPINE W/O DYE: CPT | Mod: 26

## 2019-09-06 PROCEDURE — 99284 EMERGENCY DEPT VISIT MOD MDM: CPT | Mod: 25

## 2019-09-06 PROCEDURE — 70450 CT HEAD/BRAIN W/O DYE: CPT | Mod: 26

## 2019-09-06 PROCEDURE — 99284 EMERGENCY DEPT VISIT MOD MDM: CPT

## 2019-09-06 NOTE — ED PROCEDURE NOTE - PROCEDURE ADDITIONAL DETAILS
Right Arm Laceration  3cm and 4cm oval skin tears: irrigated and cleansed with saline,steri strips applied to approximate skin, DSD applied. MTangredi NP

## 2019-09-06 NOTE — ED PROVIDER NOTE - OBJECTIVE STATEMENT
88 y/o female with a PMHx of Afib, ICH s/p fall last year, Parkinson's presents to the ED s/p fall. Pt reports she was getting out of bed, slipped and hit her head on the bookcase. No LOC. +skin tears, +laceration above right eyebrow. Denies HA. No other complaints at this time.

## 2019-09-06 NOTE — ED PROVIDER NOTE - PATIENT PORTAL LINK FT
You can access the FollowMyHealth Patient Portal offered by [facility] by registering at the following website: [email or web address]. By joining [email or web address]’s FollowMyHealth portal, you will also be able to view your health information using other applications (apps) compatible with our system.

## 2019-09-06 NOTE — ED ADULT NURSE NOTE - NSIMPLEMENTINTERV_GEN_ALL_ED
Implemented All Fall with Harm Risk Interventions:  [place] to call system. Call bell, personal items and telephone within reach. Instruct patient to call for assistance. Room bathroom lighting operational. Non-slip footwear when patient is off stretcher. Physically safe environment: no spills, clutter or unnecessary equipment. Stretcher in lowest position, wheels locked, appropriate side rails in place. Provide visual cue, wrist band, yellow gown, etc. Monitor gait and stability. Monitor for mental status changes and reorient to person, place, and time. Review medications for side effects contributing to fall risk. Reinforce activity limits and safety measures with patient and family. Provide visual clues: red socks.

## 2019-09-06 NOTE — ED PROVIDER NOTE - PROGRESS NOTE DETAILS
pt with negative head ct and xray, sutured by psych nurse practitioner will d/c with alexsandra Thao DO

## 2019-09-06 NOTE — ED PROVIDER NOTE - NSCAREINITIATED _GEN_ER
"I had thought to suicide yesterday." Aysha Arellano(Attending) "I had thought about killing myself."

## 2019-09-06 NOTE — ED ADULT NURSE REASSESSMENT NOTE - NS ED NURSE REASSESS COMMENT FT1
ambulation trial done prior to dc, pt ambulated with walker without difficulty.  suture applied by frank strickland, all skin tears left and right arm was cleaned and applied clean dressing.

## 2019-09-06 NOTE — ED PROVIDER NOTE - SKIN, MLM
Skin normal color for race, warm, dry. No evidence of rash. 1cm vertical laceration above right eyebrow. Skin tear to left wrist and dorsum of right hand.

## 2019-11-14 ENCOUNTER — APPOINTMENT (OUTPATIENT)
Dept: NEUROLOGY | Facility: CLINIC | Age: 84
End: 2019-11-14
Payer: MEDICARE

## 2019-11-14 VITALS
WEIGHT: 120 LBS | HEIGHT: 65 IN | BODY MASS INDEX: 19.99 KG/M2 | RESPIRATION RATE: 14 BRPM | DIASTOLIC BLOOD PRESSURE: 77 MMHG | OXYGEN SATURATION: 97 % | SYSTOLIC BLOOD PRESSURE: 129 MMHG | HEART RATE: 71 BPM

## 2019-11-14 PROBLEM — I61.9 NONTRAUMATIC INTRACEREBRAL HEMORRHAGE, UNSPECIFIED: Chronic | Status: ACTIVE | Noted: 2019-09-06

## 2019-11-14 PROCEDURE — 99215 OFFICE O/P EST HI 40 MIN: CPT

## 2019-11-15 ENCOUNTER — MEDICATION RENEWAL (OUTPATIENT)
Age: 84
End: 2019-11-15

## 2019-11-15 ENCOUNTER — CHART COPY (OUTPATIENT)
Age: 84
End: 2019-11-15

## 2019-11-15 NOTE — DISCUSSION/SUMMARY
[FreeTextEntry1] : This is an 89-year-old right-handed female with history of Parkinson's disease diagnosed in 2010. She had an initial good response to levodopa however since 2016 has had issues with balance, dyskinesias  most likely  bi phasic. She has not been able to tolerate amantadine in the past. Currently she is on small dose of medication- Sinemet half a tablet in the morning, Azilect 0.5 mg and Klonopin 0.25 mg. She is noticing dyskinetic movements especially around her neck which are very bothersome and slowness in daily activities. She has history of orthostatic hypotension and was on midodrine- currently not on and not orthostatic.\par Impression: Parkinsonism with biphasic dyskinesias\par Plan\par Different treatment options were discussed. One was to stop all levodopa and just increase Azilect the other one was to get smaller doses of levodopa more frequently. The patient will try mixing half a tablet of levodopa 25/100 in OJ  and  sipping it twice a day. If she tolerates this then they may increase the frequency.\par At some point would like to try extended-release levodopa at night. Neupro Patch would be an option. Rytary at low dose also be tried.\par PT OT- home based\par Speech and swallow evaluation\par Followup in one month

## 2019-11-15 NOTE — HISTORY OF PRESENT ILLNESS
[FreeTextEntry1] : This is an 89-year-old right-handed female who presents with chief complaints of Parkinson's disease. At this visit she is accompanied by her daughter Kylee. History is obtained from both of them and  review of records. \par \par She was diagnosed with a Parkinsons Disease d/t head and hand tremors R>L.  she was initiated on Sinemet this was very effective initially.  June 2016 she started falling and had several falls since. Now she is experiencing difficulty getting up from the chair,. adjusting in bed, getting out of car. She is needing assistance with all activities. she endorses difficulty swallowing. Her biggest concern is head movements- described as head bobbing (calls it so violent at times like woody wood pecker).Has been to several neurologist and adjustments to LD tried, but not helpful. Currently she is on a low dose - just half of 25/100 and 0.5mg of azilect. inspite if that she gets violent dyskinesias within 20 min, that can last 30 mins and then resolve. sometimes in afternoon she gets an increase in neck movement. At that time she feels restless and anxious and takes 0.25mg of klonopin. She feels her best later in the evening. She denies REM behavior disorder she denies any difficulty with sleep.\par No changes in memory. no hallucinations at present,   she had on  amantadine. wears a diaper, constipation present.\par \par

## 2019-11-15 NOTE — PHYSICAL EXAM
[General Appearance - Alert] : alert [Oriented To Time, Place, And Person] : oriented to person, place, and time [General Appearance - In No Acute Distress] : in no acute distress [Extraocular Movements] : extraocular movements were intact [Outer Ear] : the ears and nose were normal in appearance [Neck Cervical Mass (___cm)] : no neck mass was observed [Abdomen Soft] : soft [] : no rash [FreeTextEntry1] : not orthostatic

## 2019-12-11 NOTE — ED PROVIDER NOTE - MEDICAL DECISION MAKING DETAILS
Procedure is scheduled in [facility].   87F in with epistaxis, cbc, pt/inr given that patient is on coumadin, direct pressure, afrin, possible cautery with silver nitrate.

## 2020-02-25 ENCOUNTER — APPOINTMENT (OUTPATIENT)
Dept: NEUROLOGY | Facility: CLINIC | Age: 85
End: 2020-02-25

## 2020-03-19 ENCOUNTER — APPOINTMENT (OUTPATIENT)
Dept: NEUROLOGY | Facility: CLINIC | Age: 85
End: 2020-03-19

## 2020-05-26 ENCOUNTER — APPOINTMENT (OUTPATIENT)
Dept: NEUROLOGY | Facility: CLINIC | Age: 85
End: 2020-05-26

## 2020-05-27 ENCOUNTER — APPOINTMENT (OUTPATIENT)
Dept: NEUROLOGY | Facility: CLINIC | Age: 85
End: 2020-05-27
Payer: MEDICARE
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
## 2020-05-27 PROCEDURE — 99215 OFFICE O/P EST HI 40 MIN: CPT | Mod: 95

## 2020-05-27 RX ORDER — CLONAZEPAM 0.5 MG/1
0.5 TABLET ORAL TWICE DAILY
Qty: 90 | Refills: 0 | Status: ACTIVE | COMMUNITY
Start: 2019-11-15 | End: 1900-01-01

## 2020-05-27 NOTE — HISTORY OF PRESENT ILLNESS
[FreeTextEntry1] : Consent for Tele health  visit obtained from patient.\par She is 89-year-old patient with diagnosed with Parkinson disease was seen in the past by me and referred to Parkinson disease specialist till  recently with exacerbated by violent cervical dyskinesias ,head-bobbing persistent restlessness and dizziness difficulty swallowing . Medications were reduced significantly since last visit currently taking Sinemet 25/100 1.5 tablet once a day and  Azilect 0.5 mg once a day in the morning with the Klonopin 0.25 mg p.r.n. as needed.\par Patient still feels persistent symptoms and requesting evaluation. Unable to see her in the office and evaluated on Tele health due to COVID emergency.\par The patient denies of any other complaints except for an episode of syncope in the past no recent complaints. Unable to tolerate amantadine in the past due to hallucinations. Ambulates but stopped physical therapy recently.

## 2020-05-27 NOTE — REVIEW OF SYSTEMS
[Poor Coordination] : poor coordination [Feeling Tired] : feeling tired [Difficulty Walking] : difficulty walking [Dizziness] : dizziness [Frequent Falls] : frequent falls [Negative] : Endocrine [de-identified] : Dyskinesias\par TRemors\par Headbobbing

## 2020-05-27 NOTE — REASON FOR VISIT
[Home] : at home, [unfilled] , at the time of the visit. [Medical Office: ([facility])___] : at the medical office located in  [Verbal consent obtained from patient] : the patient, [unfilled] [FreeTextEntry3] : Mirtha Crawford [FreeTextEntry4] : DR. Ch

## 2020-05-27 NOTE — PHYSICAL EXAM
[Cranial Nerves Oculomotor (III)] : extraocular motion intact [Cranial Nerves Trigeminal (V)] : facial sensation intact symmetrically [Cranial Nerves Vestibulocochlear (VIII)] : hearing was intact bilaterally [Cranial Nerves Facial (VII)] : face symmetrical [Limited Balance] : the patient's balance was impaired [Cranial Nerves Hypoglossal (XII)] : there was no tongue deviation with protrusion [Cranial Nerves Accessory (XI - Cranial And Spinal)] : head turning and shoulder shrug symmetric [Cranial Nerves Glossopharyngeal (IX)] : tongue and palate midline [FreeTextEntry4] : Intact [Tremor] : a tremor present [FreeTextEntry6] : Bilat tremors\par head bobbing with dyskinesias \par Power and rigidity can not be tested [FreeTextEntry5] : Pupils and Fundi not tested

## 2020-05-27 NOTE — DISCUSSION/SUMMARY
[FreeTextEntry1] : She is 89-year-old patient with advanced Parkinson disease with underlying dyskinesias do not believe it is medication related as patient is not taking much of medication at this time.\par Currently taking low-dose Azilect 0.5 mg in the morning.\par Sinemet 25/100 1.5 tabs in the afternoon once a day.\par Recommendation to discontinue Sinemet.\par Try Rytary low dose 23.75/95 mg  twice a  day.\par Recommended to call me if there's any side effects of the medication.\par Discussed with the patient and her daughter.\par Continue PT and OT when stable.\par Continue Klonopin as needed.\par Followup in one month.

## 2020-06-17 ENCOUNTER — APPOINTMENT (OUTPATIENT)
Dept: NEUROLOGY | Facility: CLINIC | Age: 85
End: 2020-06-17
Payer: MEDICARE

## 2020-06-17 PROCEDURE — 99214 OFFICE O/P EST MOD 30 MIN: CPT | Mod: 95

## 2020-06-17 RX ORDER — SERTRALINE HYDROCHLORIDE 50 MG/1
50 TABLET, FILM COATED ORAL
Refills: 0 | Status: DISCONTINUED | COMMUNITY
End: 2020-06-17

## 2020-06-17 NOTE — HISTORY OF PRESENT ILLNESS
[FreeTextEntry1] : Verbal consent obtained from patient for  Telehealth visit.\par She is 89-year-old patient seen on Tele health visit seen and evaluated for Parkinson disease with dyskinesias under care of Dr. Ramos recently seen and started on Rytary low-dose which patient appears to be tolerating well taking only  one capsule daily. Symptoms improved but complaining about fatigue and tiredness and fogginess in the head.\par But dyskinesias improved significantly.

## 2020-06-17 NOTE — PHYSICAL EXAM
[Cranial Nerves Oculomotor (III)] : extraocular motion intact [Cranial Nerves Trigeminal (V)] : facial sensation intact symmetrically [Cranial Nerves Facial (VII)] : face symmetrical [Cranial Nerves Vestibulocochlear (VIII)] : hearing was intact bilaterally [Cranial Nerves Glossopharyngeal (IX)] : tongue and palate midline [Cranial Nerves Accessory (XI - Cranial And Spinal)] : head turning and shoulder shrug symmetric [FreeTextEntry5] : Pupils and Fundi not able to test [FreeTextEntry4] : Limited exam [Cranial Nerves Hypoglossal (XII)] : there was no tongue deviation with protrusion [FreeTextEntry8] : Not tested [FreeTextEntry6] : Head tremors present much improved since last visit [FreeTextEntry9] : Not tested

## 2020-06-17 NOTE — REVIEW OF SYSTEMS
[Memory Lapses or Loss] : memory loss [Feeling Tired] : feeling tired [Sleep Disturbances] : sleep disturbances [Poor Coordination] : poor coordination [Depression] : depression [Negative] : Integumentary [de-identified] : Dyskinesias improved since started on Rytary.\par fogginess in head

## 2020-06-17 NOTE — REASON FOR VISIT
[Home] : at home, [unfilled] , at the time of the visit. [Medical Office: ([facility])___] : at the medical office located in  [Verbal consent obtained from patient] : the patient, [unfilled] [FreeTextEntry4] : Dr. DANNY Colbert [FreeTextEntry3] : Self

## 2020-06-17 NOTE — DISCUSSION/SUMMARY
[FreeTextEntry1] : This 89-year-old patient seen on tele health visit due to COVID emergency with advanced Parkinson disease with underlying dyskinesias improved since started on Rytary 23.75/95 milligrams taking only once a day.\par Recommend titrate the dose gradually and slowly to twice a day if tolerated.\par Recommend discontinue Seroquel which could be contributing to her excessive tiredness.\par Continue other medications.\par Reduce Klonopin if she does not require the medication.\par Follow up with you for other medical problems.\par Patient education provided discussed with the patient and her daughter.\par

## 2020-07-21 ENCOUNTER — APPOINTMENT (OUTPATIENT)
Dept: DERMATOLOGY | Facility: CLINIC | Age: 85
End: 2020-07-21

## 2020-10-09 ENCOUNTER — RX RENEWAL (OUTPATIENT)
Age: 85
End: 2020-10-09

## 2020-12-11 ENCOUNTER — NON-APPOINTMENT (OUTPATIENT)
Age: 85
End: 2020-12-11

## 2021-01-14 ENCOUNTER — APPOINTMENT (OUTPATIENT)
Dept: NEUROLOGY | Facility: CLINIC | Age: 86
End: 2021-01-14
Payer: MEDICARE

## 2021-01-14 PROCEDURE — 99214 OFFICE O/P EST MOD 30 MIN: CPT | Mod: 95

## 2021-01-14 NOTE — DISCUSSION/SUMMARY
[FreeTextEntry1] : She is a 89-year-old patient with a history of advanced Parkinson disease with underlying dyskinesias from taking Sinemet 25/100 one tablet 3 times a day improved symptoms after starting him Rytary 23.75/95 mg 1 cap twice a day.\par Unable to tolerate higher doses.\par Currently off Seroquel and reduced dose of Klonopin.\par Might titrate Rytary  dose gradually later.\par At present continue physical therapy.\par Follow up with you for other medical problems..\par Returns for followup evaluation the office in 3 months or as needed.\par  Patient education provided and discussed with the patient and her daughter.\par Adjust other medications as needed.\par \par \par \par

## 2021-01-14 NOTE — REVIEW OF SYSTEMS
[Feeling Tired] : feeling tired [Memory Lapses or Loss] : memory loss [Poor Coordination] : poor coordination [Sleep Disturbances] : sleep disturbances [Depression] : depression [Negative] : Heme/Lymph [de-identified] : Dyskinesias improved since started on Rytary.\par fogginess in head

## 2021-01-14 NOTE — HISTORY OF PRESENT ILLNESS
[FreeTextEntry1] : Verbal consent obtained from patient's daughter and patient for Tele health visit due to COVID pandemic.\par She is  89-year-old patient with a history of Parkinson disease with the dyskinesias and dystonic movements currently taking Rytary low-dose one capsule 2 times a day with improving symptoms in the beginning recently complaining about generalized fatigue and tiredness and fall.\par Recommended to see you and diagnosed to have UTI and dehydration and started on antibiotic treatment with Cipro and vitamin D supplements as per patient's daughter.\par Still doing home physical therapy twice a week at home. Patient still feeling weak in general and tired.\par Admits that she doesn't drink enough fluids regularly.\par Fogginess in the head and tiredness constantly\par Denies of any focal weakness except involuntary movements especially in the morning.\par No other significant changes.\par Unable to complete examination on Doximity and completing the conversation on the telephone.\par

## 2021-01-14 NOTE — PHYSICAL EXAM
[Cranial Nerves Oculomotor (III)] : extraocular motion intact [Cranial Nerves Trigeminal (V)] : facial sensation intact symmetrically [Cranial Nerves Facial (VII)] : face symmetrical [Cranial Nerves Vestibulocochlear (VIII)] : hearing was intact bilaterally [Cranial Nerves Glossopharyngeal (IX)] : tongue and palate midline [Cranial Nerves Accessory (XI - Cranial And Spinal)] : head turning and shoulder shrug symmetric [Cranial Nerves Hypoglossal (XII)] : there was no tongue deviation with protrusion [FreeTextEntry4] : Limited exam [FreeTextEntry5] : Pupils and Fundi not able to test [FreeTextEntry6] : Head tremors present much improved since last visit [FreeTextEntry8] : Not tested [FreeTextEntry9] : Not tested

## 2021-01-14 NOTE — REASON FOR VISIT
[Home] : at home, [unfilled] , at the time of the visit. [Medical Office: ([facility])___] : at the medical office located in  [Mother] : mother [Verbal consent obtained from patient] : the patient, [unfilled] [Follow-Up: _____] : a [unfilled] follow-up visit [FreeTextEntry1] : Follow up for pD with Dyskinesias

## 2021-06-22 ENCOUNTER — RX RENEWAL (OUTPATIENT)
Age: 86
End: 2021-06-22

## 2021-07-26 ENCOUNTER — RX RENEWAL (OUTPATIENT)
Age: 86
End: 2021-07-26

## 2021-07-30 ENCOUNTER — APPOINTMENT (OUTPATIENT)
Dept: NEUROLOGY | Facility: CLINIC | Age: 86
End: 2021-07-30
Payer: MEDICARE

## 2021-07-30 DIAGNOSIS — R42 DIZZINESS AND GIDDINESS: ICD-10-CM

## 2021-07-30 DIAGNOSIS — G24.9 DYSTONIA, UNSPECIFIED: ICD-10-CM

## 2021-07-30 DIAGNOSIS — R25.8 OTHER ABNORMAL INVOLUNTARY MOVEMENTS: ICD-10-CM

## 2021-07-30 PROCEDURE — 99215 OFFICE O/P EST HI 40 MIN: CPT | Mod: 95

## 2021-07-30 RX ORDER — RASAGILINE MESYLATE 1 MG/1
1 TABLET ORAL
Refills: 0 | Status: DISCONTINUED | COMMUNITY
End: 2021-07-30

## 2021-07-30 RX ORDER — WARFARIN 2 MG/1
2 TABLET ORAL
Refills: 0 | Status: DISCONTINUED | COMMUNITY
End: 2021-07-30

## 2021-07-30 RX ORDER — APIXABAN 2.5 MG/1
2.5 TABLET, FILM COATED ORAL
Refills: 0 | Status: ACTIVE | COMMUNITY

## 2021-07-30 NOTE — REVIEW OF SYSTEMS
[Feeling Tired] : feeling tired [Memory Lapses or Loss] : memory loss [Poor Coordination] : poor coordination [Sleep Disturbances] : sleep disturbances [Depression] : depression [Negative] : Heme/Lymph [de-identified] : Dyskinesias improved since started on Rytary.\par fogginess in head

## 2021-07-30 NOTE — DISCUSSION/SUMMARY
[FreeTextEntry1] : She is 89 yo with advanced PD with dyskinesias improved sx since changed to Rytary,.\par C/o Lightheadedness most likely medication effects orthostasis? metoprolol ? not enough fluid intake .\par Rec Hydration.\par D/c Azilect.\par Discuss with Dr. Abraham ? can adjust metoprolol dose.\par Continue Rytary.\par Discussed with Pt and Daughter Kylee.\par Contact me in 1 month.\par Will follow up in 3  months.

## 2021-07-30 NOTE — HISTORY OF PRESENT ILLNESS
[FreeTextEntry1] : Verbal consent obtained from Pt's daughter Kylee for tele health visit .\par Pt 89 yo with advanced PD with  dyskinesias currently taking Ryrtary 23.75/ 95 twice a day stable c/o lightheadedness in the afternoon recently. No falls or LOC. \par No focal c/o. Recently changed Warfarin to Eliquis .\par Still on Metoprolol and Azilect .\par  passed away .\par

## 2021-07-30 NOTE — REASON FOR VISIT
[Home] : at home, [unfilled] , at the time of the visit. [Family Member] : family member [FreeTextEntry3] : Kyele eng [Follow-Up: _____] : a [unfilled] follow-up visit [FreeTextEntry1] : Follow up for pt with PD and Dystonia

## 2021-07-30 NOTE — CONSULT LETTER
ARRIVED TO ICU VIA STRETCHER.

AMBULATED TO BED. NO DISTRESS NOTED. CONNECTED TO BEDSIDE MONITOR. RECEIVED REPORT FROM NILSON CASILLAS RN. ASSUMED CARE. ADMISSION ASSESSMENT COMPLETED AS CHARTED. [Dear  ___] : Dear  [unfilled], [Consult Letter:] : I had the pleasure of evaluating your patient, [unfilled]. [Please see my note below.] : Please see my note below. [Consult Closing:] : Thank you very much for allowing me to participate in the care of this patient.  If you have any questions, please do not hesitate to contact me. [Sincerely,] : Sincerely,

## 2021-08-03 RX ORDER — LEVODOPA AND CARBIDOPA 95; 23.75 MG/1; MG/1
23.75-95 CAPSULE, EXTENDED RELEASE ORAL
Qty: 180 | Refills: 1 | Status: ACTIVE | COMMUNITY
Start: 2020-06-26 | End: 1900-01-01

## 2021-08-03 RX ORDER — MECLIZINE HYDROCHLORIDE 12.5 MG/1
12.5 TABLET ORAL
Qty: 14 | Refills: 0 | Status: ACTIVE | COMMUNITY
Start: 2021-07-22

## 2021-08-03 RX ORDER — QUETIAPINE FUMARATE 25 MG/1
25 TABLET ORAL
Qty: 90 | Refills: 0 | Status: ACTIVE | COMMUNITY
Start: 2021-04-23

## 2021-08-03 RX ORDER — SERTRALINE 25 MG/1
25 TABLET, FILM COATED ORAL
Qty: 90 | Refills: 0 | Status: ACTIVE | COMMUNITY
Start: 2021-07-22

## 2021-08-03 RX ORDER — NITROFURANTOIN MACROCRYSTALS 100 MG/1
100 CAPSULE ORAL
Qty: 20 | Refills: 0 | Status: ACTIVE | COMMUNITY
Start: 2021-01-30

## 2021-08-03 RX ORDER — CLONAZEPAM 1 MG/1
1 TABLET ORAL
Qty: 60 | Refills: 0 | Status: ACTIVE | COMMUNITY
Start: 2021-02-05

## 2021-08-03 RX ORDER — DOXYCYCLINE HYCLATE 100 MG/1
100 TABLET ORAL
Qty: 14 | Refills: 0 | Status: ACTIVE | COMMUNITY
Start: 2021-02-02

## 2021-08-03 RX ORDER — QUETIAPINE FUMARATE 50 MG/1
50 TABLET ORAL
Qty: 90 | Refills: 0 | Status: ACTIVE | COMMUNITY
Start: 2020-10-01

## 2021-08-25 ENCOUNTER — APPOINTMENT (OUTPATIENT)
Dept: GASTROENTEROLOGY | Facility: CLINIC | Age: 86
End: 2021-08-25

## 2021-10-11 NOTE — DISCHARGE NOTE ADULT - PLAN OF CARE
to prevent further fall Repeat CT head in 1 week and follow up with Dr Mcfarland and Dr Millan Continue Regimen: metrogel Render In Strict Bullet Format?: No Detail Level: Zone Plan: Pt states that metronidazole does help when he uses it.

## 2022-01-03 ENCOUNTER — RX RENEWAL (OUTPATIENT)
Age: 87
End: 2022-01-03

## 2022-04-22 ENCOUNTER — APPOINTMENT (OUTPATIENT)
Dept: NEUROLOGY | Facility: CLINIC | Age: 87
End: 2022-04-22

## 2022-08-12 ENCOUNTER — EMERGENCY (EMERGENCY)
Facility: HOSPITAL | Age: 87
LOS: 0 days | Discharge: HOME | End: 2022-08-12
Attending: EMERGENCY MEDICINE
Payer: MEDICARE

## 2022-08-12 VITALS
OXYGEN SATURATION: 100 % | SYSTOLIC BLOOD PRESSURE: 132 MMHG | HEIGHT: 63 IN | TEMPERATURE: 98 F | RESPIRATION RATE: 18 BRPM | WEIGHT: 100.09 LBS | HEART RATE: 73 BPM | DIASTOLIC BLOOD PRESSURE: 61 MMHG

## 2022-08-12 VITALS
TEMPERATURE: 98 F | SYSTOLIC BLOOD PRESSURE: 145 MMHG | RESPIRATION RATE: 18 BRPM | DIASTOLIC BLOOD PRESSURE: 75 MMHG | HEART RATE: 71 BPM | OXYGEN SATURATION: 95 %

## 2022-08-12 DIAGNOSIS — Z91.041 RADIOGRAPHIC DYE ALLERGY STATUS: ICD-10-CM

## 2022-08-12 DIAGNOSIS — I10 ESSENTIAL (PRIMARY) HYPERTENSION: ICD-10-CM

## 2022-08-12 DIAGNOSIS — M25.521 PAIN IN RIGHT ELBOW: ICD-10-CM

## 2022-08-12 DIAGNOSIS — E78.00 PURE HYPERCHOLESTEROLEMIA, UNSPECIFIED: ICD-10-CM

## 2022-08-12 DIAGNOSIS — G20 PARKINSON'S DISEASE: ICD-10-CM
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
## 2022-08-12 DIAGNOSIS — Z88.0 ALLERGY STATUS TO PENICILLIN: ICD-10-CM

## 2022-08-12 DIAGNOSIS — I48.91 UNSPECIFIED ATRIAL FIBRILLATION: ICD-10-CM

## 2022-08-12 DIAGNOSIS — Z86.79 PERSONAL HISTORY OF OTHER DISEASES OF THE CIRCULATORY SYSTEM: ICD-10-CM

## 2022-08-12 DIAGNOSIS — Z79.01 LONG TERM (CURRENT) USE OF ANTICOAGULANTS: ICD-10-CM
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
## 2022-08-12 DIAGNOSIS — S09.90XA UNSPECIFIED INJURY OF HEAD, INITIAL ENCOUNTER: ICD-10-CM

## 2022-08-12 PROCEDURE — 70450 CT HEAD/BRAIN W/O DYE: CPT | Mod: MA

## 2022-08-12 PROCEDURE — 72125 CT NECK SPINE W/O DYE: CPT | Mod: MA

## 2022-08-12 PROCEDURE — 72125 CT NECK SPINE W/O DYE: CPT | Mod: 26,MA

## 2022-08-12 PROCEDURE — 99284 EMERGENCY DEPT VISIT MOD MDM: CPT

## 2022-08-12 PROCEDURE — 73080 X-RAY EXAM OF ELBOW: CPT | Mod: RT

## 2022-08-12 PROCEDURE — 70450 CT HEAD/BRAIN W/O DYE: CPT | Mod: 26,MA

## 2022-08-12 PROCEDURE — 99284 EMERGENCY DEPT VISIT MOD MDM: CPT | Mod: 25

## 2022-08-12 PROCEDURE — 73080 X-RAY EXAM OF ELBOW: CPT | Mod: 26,RT

## 2022-08-12 RX ORDER — ACETAMINOPHEN 500 MG
650 TABLET ORAL ONCE
Refills: 0 | Status: COMPLETED | OUTPATIENT
Start: 2022-08-12 | End: 2022-08-12

## 2022-08-12 RX ADMIN — Medication 650 MILLIGRAM(S): at 07:07

## 2022-08-12 NOTE — ED PROVIDER NOTE - OBJECTIVE STATEMENT
89-year-old female history of hypertension high cholesterol A. fib on Eliquis Parkinson's presents to the ED status post fall out of bed.  Granddaughter at bedside giving history.  States she sleeps in the bed with the patient.  States that she heard a loud thud and found patient on the floor.  Patient is normally wheelchair-bound walks with a walker and assistance but usually does not get up on her own.  In the past they have had bed rails on the bed to prevent this however patient has not attempted to get up in a long time so they have not had bed rails on the bed.  Patient states she was having a bad dream.  Landed on her right side hit her head and her left elbow.  Patient was assisted back into bed and then brought in for evaluation. The patient denies chest pain back pain abdominal pain pain to lower extremity.As per granddaughter who lives with patient knows patient well the patient is at her baseline mental status and has been for the past few days.

## 2022-08-12 NOTE — ED PROVIDER NOTE - PHYSICAL EXAMINATION
Constitutional: NAD    Eyes: PERRLA EOMI  Head: Normocephalic atraumatic  ENT: No agarwal sign, no raccoon eyes,   Mouth: MMM  Cardiac: regular rate   Resp: Lungs CTAB  GI: Abd s/nt/nd  Neuro: CN2-12 intact GCS 15  Skin: No rashes, no bruising to chest, back, abdomen or extremities  Msk: No midline spinal ttp,  no ttp of facial bones, no ttp to chest wall, pelvis stable, no ttp shoulder wrists hips knees ankles. mild ttp right elbow.

## 2022-08-12 NOTE — ED ADULT NURSE NOTE - CCCP TRG CHIEF CMPLNT
[address]                      [address] [facility], [address] [facility]                                  CONSULTATION    PATIENT NAME: Leonardo Chang                      :        2003  MED REC NO:   [id number]                            ROOM:       IC14  ACCOUNT NO:   [de-identified]                           ADMIT DATE: 2022  PROVIDER:     Sanchez Duvall MD    CONSULT DATE:  2022    ENDOCRINE CONSULTATION    REFERRING PROVIDER:  Jorge L Prakash DO    REASON FOR CONSULTATION:  Management of diabetic ketoacidosis. CHIEF COMPLAINT AND HISTORY OF PRESENT ILLNESS:  Obtained through prior  H and P and the patient's father. The patient is an 25year-old male  with known history of type 1 diabetes, predominantly [place]ic-speaking,  admitted with nausea, vomiting, hyperglycemia for the last 3 days, also  had nonbloody and nonbilious emesis. Apparently, was taking insulin as  directed. Denies any fever or chills. Works at [employer] surgery from  [address] [place] [address] reflected diabetic ketoacidosis. Hemoglobin A1c was 11.8. Chemistries done initially shows a glucose of 606, sodium 132, potassium  5.8, chloride 86, CO2 was 12, BUN 26, creatinine 0.95, anion gap was 34. Most current chemistries show improvement in his DKA status with  normalization of an anion gap. Sodium 136, potassium 4.1, chloride 105,  CO2 was 22, BUN 20, creatinine 0.73. The patient at that time denied any nausea, vomiting, wanted to eat when  seen in ICU. Has had type 1 diabetes for many years. Home medications  for his diabetes were not available for review. PAST MEDICAL HISTORY:  Significant for type 1 diabetes. PAST SURGICAL HISTORY:  Reviewed, noncontributory. FAMILY HISTORY:  Reviewed, noncontributory. PERSONAL AND SOCIAL HISTORY:  No smoking, alcohol, substance abuse. MEDICATIONS:  Here include IV fluids, IV insulin drip. ALLERGIES:  None.     REVIEW OF SYSTEMS:  Other than nausea, vomiting, hyperglycemia, 14-point  review of systems essentially unremarkable. PHYSICAL EXAMINATION:  GENERAL:  The patient is alert, awake, in no obvious distress. Obeying  commands. Opening eyes to verbal stimuli. VITAL SIGNS:  Blood pressure was 116/49, pulse rate was 87, respiratory  rate 15, temperature 98.7. HEENT:  Normocephalic, atraumatic. Pupils equal and reactive to light. Oral mucosa was dry. NECK:  Supple. Trachea in midline. CHEST:  Lungs were clear to auscultation bilaterally. No wheezing or  crackles were heard. CARDIOVASCULAR:  Heart sounds were normal.  No murmurs or thrills were  present. ABDOMEN:  Soft, nontender. Bowel sounds are present. EXTREMITIES:  Lower extremities reveal no edema. SKIN:  Dry, but no rashes. MUSCULOSKELETAL:  No joint swelling. NEUROLOGIC:  The patient unable to follow commands. PSYCHIATRIC:  The patient unable to complete. LABORATORY DATA:  As above. Assessment:  DKA improving, type 1 diabetes. PLAN:  Continue the patient on IV insulin drip until tomorrow, we will  switch to subcutaneous insulin. Start on 5-carb diet. Continue IV  fluids. Eventually, would be discharged home on probably NPH and  regular insulin based on the patient's insulin. Long-term A1c goal of 7  or lower. Monitor chemistries, electrolytes, Accu-Cheks every 2 hours. Total time spent was 50 minutes. Thank you for the consult.         Man Lamas MD    D: 07/11/2022 21:59:06       T: 07/11/2022 22:01:34     [email or web address]  Job#: [id number]     Doc#: [id number]    CC: fall

## 2022-08-12 NOTE — ED ADULT TRIAGE NOTE - CHIEF COMPLAINT QUOTE
Unwitnessed fall on Eliquis. Hematoma and abrasion to right arm. Hx dementia, poor historian, denies any complaints.

## 2022-08-12 NOTE — ED PROVIDER NOTE - PROGRESS NOTE DETAILS
CT negative. xray negative. pt bearing weight on legs at baseline without any pain. discussed results with patient and family at bedside. VSS. pt and family want to go home. will dc with follow up and strict return precautions. Abiodun Valdez M.D., Attending Physician

## 2022-08-12 NOTE — ED ADULT NURSE NOTE - NSIMPLEMENTINTERV_GEN_ALL_ED
Implemented All Fall with Harm Risk Interventions:  [place] to call system. Call bell, personal items and telephone within reach. Instruct patient to call for assistance. Room bathroom lighting operational. Non-slip footwear when patient is off stretcher. Physically safe environment: no spills, clutter or unnecessary equipment. Stretcher in lowest position, wheels locked, appropriate side rails in place. Provide visual cue, wrist band, yellow gown, etc. Monitor gait and stability. Monitor for mental status changes and reorient to person, place, and time. Review medications for side effects contributing to fall risk. Reinforce activity limits and safety measures with patient and family. Provide visual clues: red socks.

## 2022-08-12 NOTE — ED PROVIDER NOTE - NSFOLLOWUPINSTRUCTIONS_ED_ALL_ED_FT
1. return for worsening symptoms or anything concerning to you  2. take all home meds as prescribed  3. follow up with your pmd call to make an appointment  4. Take Tylenol 650 mg every 6 hours as needed for pain.    Head Injury    WHAT YOU NEED TO KNOW:    A head injury is most often caused by a blow to the head. This may occur from a fall, bicycle injury, sports injury, being struck in the head, or a motor vehicle accident.     DISCHARGE INSTRUCTIONS:    Call 911 or have someone else call for any of the following:     You cannot be woken.      You have a seizure.      You stop responding to others or you faint.      You have blurry or double vision.      Your speech becomes slurred or confused.      You have arm or leg weakness, loss of feeling, or new problems with coordination.      Your pupils are larger than usual or one pupil is a different size than the other.       You have blood or clear fluid coming out of your ears or nose.    Return to the emergency department if:     You have repeated or forceful vomiting.      You feel confused.      Your headache gets worse or becomes severe.      You or someone caring for you notices that you are harder to wake than usual.    Contact your healthcare provider if:     Your symptoms last longer than 6 weeks after the injury.      You have questions or concerns about your condition or care.    Medicines:     Acetaminophen decreases pain. Acetaminophen is available without a doctor's order. Ask how much to take and how often to take it. Follow directions. Acetaminophen can cause liver damage if not taken correctly.      Take your medicine as directed. Contact your healthcare provider if you think your medicine is not helping or if you have side effects. Tell him or her if you are allergic to any medicine. Keep a list of the medicines, vitamins, and herbs you take. Include the amounts, and when and why you take them. Bring the list or the pill bottles to follow-up visits. Carry your medicine list with you in case of an emergency.    Self-care:     Rest or do quiet activities for 24 to 48 hours. Limit your time watching TV, using the computer, or doing tasks that require a lot of thinking. Slowly return to your normal activities as directed. Do not play sports or do activities that may cause you to get hit in the head. Ask your healthcare provider when you can return to sports.       Apply ice on your head for 15 to 20 minutes every hour or as directed. Use an ice pack, or put crushed ice in a plastic bag. Cover it with a towel before you apply it to your skin. Ice helps prevent tissue damage and decreases swelling and pain.       Have someone stay with you for 24 hours or as directed. This person can monitor you for complications and call 911. When you are awake the person should ask you a few questions to see if you are thinking clearly. An example would be to ask your name or your address.     Prevent another head injury:     Wear a helmet that fits properly. Do this when you play sports, or ride a bike, scooter, or skateboard. Helmets help decrease your risk of a serious head injury. Talk to your healthcare provider about other ways you can protect yourself if you play sports.      Wear your seat belt every time you are in a car. This helps to decrease your risk for a head injury if you are in a car accident.     Follow up with your healthcare provider as directed: Write down your questions so you remember to ask them during your visits.

## 2022-08-12 NOTE — ED PROVIDER NOTE - NS ED ROS FT
Constitutional: No fever or chills  Eyes: No visual changes  HEENT: No throat pain  CV: No chest pain  Resp: No SOB no cough  GI: No abd pain, nausea or vomiting  : No dysuria  MSK: + right elbow pain  Skin: No rash  Neuro: No headache

## 2022-08-12 NOTE — ED ADULT NURSE REASSESSMENT NOTE - NS ED NURSE REASSESS COMMENT FT1
pt. dcd, awaiting ambulette, discharge instructions given to granddaughter, pt. w/o apparent distress,

## 2022-08-12 NOTE — ED PROVIDER NOTE - CLINICAL SUMMARY MEDICAL DECISION MAKING FREE TEXT BOX
89-year-old female history of A. fib on Eliquis Parkinson's presents to the ED status post roll out of bed and fall onto her right side.  Exam with mild tenderness to right elbow no obvious signs of significant traumatic injury.  Neuro alert called on arrival will perform CT head x-ray pain control reassess
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
## 2022-08-12 NOTE — ED PROVIDER NOTE - PATIENT PORTAL LINK FT
You can access the FollowMyHealth Patient Portal offered by [facility] by registering at the following website: [email or web address]. By joining [facility]’s FollowMyHealth portal, you will also be able to view your health information using other applications (apps) compatible with our system.

## 2022-08-16 ENCOUNTER — APPOINTMENT (OUTPATIENT)
Dept: NEUROLOGY | Facility: CLINIC | Age: 87
End: 2022-08-16

## 2022-08-16 PROCEDURE — 99214 OFFICE O/P EST MOD 30 MIN: CPT | Mod: 95

## 2022-08-16 RX ORDER — RASAGILINE 0.5 MG/1
0.5 TABLET ORAL DAILY
Qty: 30 | Refills: 3 | Status: DISCONTINUED | COMMUNITY
Start: 2019-11-14 | End: 2022-08-16

## 2022-08-16 RX ORDER — ESCITALOPRAM OXALATE 5 MG/1
5 TABLET ORAL
Qty: 30 | Refills: 0 | Status: DISCONTINUED | COMMUNITY
Start: 2021-04-23 | End: 2022-08-16

## 2022-08-16 NOTE — DISCUSSION/SUMMARY
[FreeTextEntry1] : Ms. Crawford is a 89 yo with advanced PD with dyskinesias.\par She was previously on Sinemet but had dyskinesias which have improved since starting Rytary.\par \par She has had syncope in the past.\par \par Her main complaint is sleepiness which she feels is secondary to Rytary.\par I explained that she is on the lowest dose of Rytary and I think the sleepiness is likely related to other factors.\par \par -Suggest trial of decreasing quetiapine to 25 mg qhs. Watch for worsening of sundowning.\par -If sleepiness persists can decrease clonazepam.\par \par -Continue Rytary 23.75-95 mg BID\par -Advised to stay well hydrated\par -f/u with Dr. Velázquez regarding other medications and possibility of dose reductions.\par -She would like to restart PT. Will send referral.\par \par >30 minutes were spent on telehealth call\par f/u 4-6 months, sooner if needed

## 2022-08-16 NOTE — CONSULT LETTER
[Dear  ___] : Dear  [unfilled], [Consult Letter:] : I had the pleasure of evaluating your patient, [unfilled]. [Please see my note below.] : Please see my note below. [Consult Closing:] : Thank you very much for allowing me to participate in the care of this patient.  If you have any questions, please do not hesitate to contact me. [FreeTextEntry2] : Vernon Velázquez [FreeTextEntry3] : Sincerely,\par \par \par Estefany Hernandez MD\par Diplomate, American Academy of Psychiatry and Neurology\par Board Certified in the Subspecialty of Clinical Neurophysiology\par Board Certified in the Subspecialty of Sleep Medicine\par Board Certified in the Subspecialty of Epilepsy\par

## 2022-08-16 NOTE — PHYSICAL EXAM
[FreeTextEntry1] : Video Exam:\par Limited\par Hunched over with increased kyphosis\par Masked facies\par Hypophonic\par Some drooling\par + head tremor\par Bradykinesia

## 2022-08-16 NOTE — HISTORY OF PRESENT ILLNESS
[Home] : at home, [unfilled] , at the time of the visit. [Medical Office: ([facility])___] : at the medical office located in  [FreeTextEntry1] : Ms. Crawford is a former patient of Dr. Colbert.\par Her daughter was present on the call and provided the history.\par She has advanced Parkinson's Disease. She was diagnosed about 10 years ago. \par \par She was last seen by Dr. Colbert ([facility]) in July 2021.\par She is not ambulatory at this time other than very short distances with a walker and assistance from two people.\par She requires full assistance for showering.\par She has a full time caretaker as well as a second aide who comes every other day. \par Her daughter is also at home.\par \par Ms. Crawford states that she feels fine when she wakes up in the morning.\par She starts to feel tired again shortly after wakening. She states that she feels like a zombie throughout the day.\par \par \par She does not sleep well throughout the night. She is restless and wakes up twice.\par She naps throughout the day.\par \par Current medications:\par Rytary 23.75-95 - 1 tablet BID\par Eliquis 2.5 mg BID\par Amlodipine 5 mg/day\par Sertraline 50 mg/day\par Metoprolol 25 mg in the AM and 12.5 mg in the PM\par Quetiapine 50 mg qhs\par clonazepam 1 mg qhs\par Atorvastatin 20 mg qhs\par Tramadol prn\par \par The quetiapine has been used because of agitation, sundowning.

## 2022-10-24 ENCOUNTER — APPOINTMENT (OUTPATIENT)
Dept: UROLOGY | Facility: CLINIC | Age: 87
End: 2022-10-24

## 2022-11-30 ENCOUNTER — APPOINTMENT (OUTPATIENT)
Dept: GASTROENTEROLOGY | Facility: CLINIC | Age: 87
End: 2022-11-30

## 2022-11-30 VITALS
WEIGHT: 126 LBS | HEIGHT: 65 IN | BODY MASS INDEX: 20.99 KG/M2 | DIASTOLIC BLOOD PRESSURE: 68 MMHG | SYSTOLIC BLOOD PRESSURE: 143 MMHG | HEART RATE: 79 BPM

## 2022-11-30 DIAGNOSIS — K86.2 CYST OF PANCREAS: ICD-10-CM

## 2022-11-30 PROCEDURE — 99204 OFFICE O/P NEW MOD 45 MIN: CPT

## 2022-12-01 ENCOUNTER — APPOINTMENT (OUTPATIENT)
Dept: UROLOGY | Facility: CLINIC | Age: 87
End: 2022-12-01

## 2022-12-01 VITALS
HEIGHT: 65 IN | SYSTOLIC BLOOD PRESSURE: 120 MMHG | BODY MASS INDEX: 20.99 KG/M2 | DIASTOLIC BLOOD PRESSURE: 62 MMHG | WEIGHT: 126 LBS

## 2022-12-01 DIAGNOSIS — K46.9 UNSPECIFIED ABDOMINAL HERNIA W/OUT OBSTRUCTION OR GANGRENE: ICD-10-CM

## 2022-12-01 DIAGNOSIS — K86.2 CYST OF PANCREAS: ICD-10-CM

## 2022-12-01 DIAGNOSIS — N39.0 URINARY TRACT INFECTION, SITE NOT SPECIFIED: ICD-10-CM

## 2022-12-01 PROCEDURE — 99203 OFFICE O/P NEW LOW 30 MIN: CPT

## 2022-12-01 NOTE — ASSESSMENT
[FreeTextEntry1] : Recurrent UTIs:\par currently asymptomatic\par We discussed self-start therapy vs prophylaxis. \par Pt doesn't want to take antibiotic daily and prefers to monitor symptoms\par start probiotics daily\par advised proper hydration\par f/u 3 months

## 2022-12-01 NOTE — HISTORY OF PRESENT ILLNESS
[FreeTextEntry1] : 89yo F hx Afib, Parkinson's disease - wheelchair bound presents for recurrent UTIs\par She presents here with her daughter and aide. Patient reports last UTI was noticed from routine exam. Sometimes urine infections are noticed with odor in urine and mental status changes\par Daughter states she gets UTI about once a month\par Reviewed recent Ucx results: one positive culture in August 2022, most cultures were contaminated or negative. \par Patient denies dysuria, abdominal or back pain. Denies fever, chills. \par \par CT w/o cont 5/2022: showed normal renal & bladder anatomy

## 2022-12-01 NOTE — LETTER BODY
[Dear  ___] : Dear  [unfilled], [Consult Letter:] : I had the pleasure of evaluating your patient, [unfilled]. [Please see my note below.] : Please see my note below. [Consult Closing:] : Thank you very much for allowing me to participate in the care of this patient.  If you have any questions, please do not hesitate to contact me. [Sincerely,] : Sincerely, [FreeTextEntry3] : Thee Marx, \par Genitourinary Medicine\par [facility]\par

## 2022-12-01 NOTE — REVIEW OF SYSTEMS
[Loss Of Hearing] : hearing loss [see HPI] : see HPI [Joint Pain] : joint pain [Limb Weakness] : limb weakness [Difficulty Walking] : difficulty walking [Negative] : Respiratory [Fever] : no fever [Chills] : no chills

## 2022-12-01 NOTE — PHYSICAL EXAM
[General Appearance - In No Acute Distress] : no acute distress [Edema] : no peripheral edema [] : no respiratory distress [Respiration, Rhythm And Depth] : normal respiratory rhythm and effort [Exaggerated Use Of Accessory Muscles For Inspiration] : no accessory muscle use [Abdomen Soft] : soft [No Focal Deficits] : no focal deficits [Oriented To Time, Place, And Person] : oriented to person, place, and time [Affect] : the affect was normal [Mood] : the mood was normal [Not Anxious] : not anxious [Abdomen Tenderness] : non-tender [Costovertebral Angle Tenderness] : no ~M costovertebral angle tenderness [FreeTextEntry1] : in wheelchair

## 2022-12-07 PROBLEM — K86.2 PANCREATIC CYST: Status: ACTIVE | Noted: 2022-12-07

## 2022-12-07 NOTE — ASSESSMENT
[FreeTextEntry1] : 89 year old woman with parkinson's disease and dementia, referred here for abnormal imaging. \par \par Patient weak, in wheelchair, with parkinson's and dementia. \par No further role for following pancreatic cyst. She will never be a chemo or surgical candidate either way, and this currently is not a cancer. \par Hernia not bothering patient, will not refer to surgeon. \par \par Told patient and family not to worry about anything found on imaging at this point in her life.

## 2022-12-07 NOTE — PHYSICAL EXAM
[Sclera] : the sclera and conjunctiva were normal [Hearing Threshold Finger Rub Not Stark] : hearing was normal [Normal Appearance] : the appearance of the neck was normal [No Neck Mass] : no neck mass was observed [Abdomen Tenderness] : non-tender [Wheelchair] : Patient in wheelchair [Normal Color / Pigmentation] : normal skin color and pigmentation [] : no rash [Skin Lesions] : no skin lesions [de-identified] : in wheelchair, very thin, elderly, chronically ill appearing [de-identified] : in wheelchair, weak [de-identified] : in wheelchair, hunched over [de-identified] : depressed mood, depressed affect, slow to respond but responds

## 2022-12-07 NOTE — REVIEW OF SYSTEMS
[Feeling Poorly] : feeling poorly [Confused] : confusion [Negative] : Heme/Lymph [FreeTextEntry1] : MSK: weak

## 2022-12-07 NOTE — HISTORY OF PRESENT ILLNESS
[FreeTextEntry1] : 89 year old woman with parkinson's disease and dementia, referred here for abnormal imaging. \par \par Although patient demented can give history and this is corroborated by aide and daughter. She denies any abodminal pain. Has an ok appetite. No nausea or vomiting. No overt signs of GI bleeding like hematemesis, melena, hematochezia. She had outpatient imaging that showed a pancreatic cyst and a hernia.

## 2022-12-10 NOTE — ED PROVIDER NOTE - RESPIRATORY, MLM
Severe AS on echo in 1/2022  Underwent MARY which showed mild to moderate concentric hypertrophy with EF of 55% with moderately thickened aortic valve with severe aortic stenosis Breath sounds clear and equal bilaterally.

## 2023-03-02 ENCOUNTER — APPOINTMENT (OUTPATIENT)
Dept: UROLOGY | Facility: CLINIC | Age: 88
End: 2023-03-02

## 2023-03-13 ENCOUNTER — APPOINTMENT (OUTPATIENT)
Dept: NEUROLOGY | Facility: CLINIC | Age: 88
End: 2023-03-13
Payer: MEDICARE

## 2023-03-13 VITALS
SYSTOLIC BLOOD PRESSURE: 119 MMHG | HEART RATE: 77 BPM | DIASTOLIC BLOOD PRESSURE: 66 MMHG | TEMPERATURE: 97.8 F | HEIGHT: 65 IN | BODY MASS INDEX: 20.99 KG/M2 | WEIGHT: 126 LBS

## 2023-03-13 DIAGNOSIS — G20 PARKINSON'S DISEASE: ICD-10-CM

## 2023-03-13 DIAGNOSIS — G24.9 DYSTONIA, UNSPECIFIED: ICD-10-CM

## 2023-03-13 PROCEDURE — 99213 OFFICE O/P EST LOW 20 MIN: CPT

## 2023-03-13 RX ORDER — CARBIDOPA AND LEVODOPA 25; 100 MG/1; MG/1
25-100 TABLET ORAL 3 TIMES DAILY
Qty: 90 | Refills: 0 | Status: DISCONTINUED | COMMUNITY
End: 2023-03-13

## 2023-03-13 RX ORDER — LATANOPROST/PF 0.005 %
0.01 DROPS OPHTHALMIC (EYE)
Refills: 0 | Status: DISCONTINUED | COMMUNITY
End: 2023-03-13

## 2023-03-13 RX ORDER — FLUOXETINE HYDROCHLORIDE 20 MG/1
20 CAPSULE ORAL
Refills: 0 | Status: DISCONTINUED | COMMUNITY
End: 2023-03-13

## 2023-03-13 RX ORDER — ALPRAZOLAM 0.25 MG/1
0.25 TABLET ORAL
Qty: 30 | Refills: 0 | Status: DISCONTINUED | COMMUNITY
Start: 2018-08-14 | End: 2023-03-13

## 2023-03-13 NOTE — DISCUSSION/SUMMARY
[FreeTextEntry1] : Ms. Crawford is a 89 yo with advanced PD with dyskinesias.\par She was previously on Sinemet but had dyskinesias which have improved since starting Rytary.\par \par She has had syncope in the past.\par \par Cognitively she is doing well. A MMSE was performed. She lost one point for not knowing the exact date and one point for not knowing which floor the office is on.\par Writing and copying were not tested; she is unable to do this due to tremors.\par \par She has neck pain, likely secondary to cervical dystonia.\par -Suggest increasing Rytary to thee times per day at approximately 7:30 AM, 12:30 PM, 5:30 PM\par -If she does not notice any improvement with the increased dose, can decrease back to twice a day.\par -Unclear if botox would be helpful; she is not interested at this time\par \par -Stay well hydrated\par \par -Continue physical therapy\par \par -If dream enactment increases can add melatonin at bedtime. AT this time it does not seem to be causing a major sleep disruption and she does not seem to be in danger of falling out of bed. \par \par \par f/u 6 months, sooner if needed\par \par

## 2023-03-13 NOTE — PHYSICAL EXAM
[FreeTextEntry1] : Examination:\par Constitutional: normal, no apparent distress\par Eyes: normal conjunctiva b/l, no ptosis, visual fields full\par Respiratory: no respiratory distress, normal effort, normal auscultation\par Cardiovascular: normal rate, rhythm, no murmurs\par Neck: supple, no masses\par Vascular: carotids normal\par Skin: normal color, no rashes\par Psych: normal mood, affect\par \par Neurological:\par Awake, alert\par Memory: Scored 26/28 on the MMSE. Unable to check writing and copying\par Language: no aphasia\par Motor: + bradykinesia, increased tone/cogwheeling\par no focal weakness in upper extremities\par Mild weakness in b/l lower extremities\par sensory: intact to light touch\par coordination: decreased amplitude of finger tap\par + rest tremor b/l, right worse than left\par DTRs: symmetric, hypoactive\par gait: in wheelchair\par + cervical dystonia

## 2023-03-13 NOTE — HISTORY OF PRESENT ILLNESS
[FreeTextEntry1] : 8/16/22:\par Ms. Crawford is a former patient of Dr. Colbert.\par Her daughter was present on the call and provided the history.\par She has advanced Parkinson's Disease. She was diagnosed about 10 years ago. \par \par She was last seen by Dr. Colbert ([facility]) in July 2021.\par She is not ambulatory at this time other than very short distances with a walker and assistance from two people.\par She requires full assistance for showering.\par She has a full time caretaker as well as a second aide who comes every other day. \par Her daughter is also at home.\par \par Ms. Crawford states that she feels fine when she wakes up in the morning.\par She starts to feel tired again shortly after wakening. She states that she feels like a zombie throughout the day.\par \par \par She does not sleep well throughout the night. She is restless and wakes up twice.\par She naps throughout the day.\par \par Current medications:\par Rytary 23.75-95 - 1 tablet BID\par Eliquis 2.5 mg BID\par Amlodipine 5 mg/day\par Sertraline 50 mg/day\par Metoprolol 25 mg in the AM and 12.5 mg in the PM\par Quetiapine 50 mg qhs\par clonazepam 1 mg qhs\par Atorvastatin 20 mg qhs\par Tramadol prn\par \par The quetiapine has been used because of agitation, sundowning.\par \par 3/13/23:\par She is here today with her daughter, Kylee and with her caretaker.\par She has drooping of her neck, which is painful.\par She finds it more painful to use a cervical collar.\par She takes Tylenol three times per day and drinks wine twice a day.\par \par She is able to sleep but she cannot change position during the night.\par \par Sometimes she flails her arms in her sleep.\par \par She is able to feed herself a bit more.\par \par \par \par \par

## 2023-06-06 ENCOUNTER — APPOINTMENT (OUTPATIENT)
Dept: NEUROLOGY | Facility: CLINIC | Age: 88
End: 2023-06-06

## 2023-09-26 ENCOUNTER — APPOINTMENT (OUTPATIENT)
Dept: NEUROLOGY | Facility: CLINIC | Age: 88
End: 2023-09-26

## 2023-11-27 RX ORDER — LEVODOPA AND CARBIDOPA 95; 23.75 MG/1; MG/1
23.75-95 CAPSULE, EXTENDED RELEASE ORAL
Qty: 90 | Refills: 5 | Status: ACTIVE | COMMUNITY
Start: 2020-05-27 | End: 1900-01-01

## 2024-04-22 ENCOUNTER — EMERGENCY (EMERGENCY)
Facility: HOSPITAL | Age: 89
LOS: 0 days | Discharge: HOME | End: 2024-04-22
Attending: STUDENT IN AN ORGANIZED HEALTH CARE EDUCATION/TRAINING PROGRAM
Payer: MEDICARE

## 2024-04-22 VITALS
TEMPERATURE: 98 F | SYSTOLIC BLOOD PRESSURE: 161 MMHG | OXYGEN SATURATION: 97 % | DIASTOLIC BLOOD PRESSURE: 60 MMHG | HEIGHT: 64 IN | HEART RATE: 53 BPM | WEIGHT: 130.07 LBS | RESPIRATION RATE: 18 BRPM

## 2024-04-22 VITALS
DIASTOLIC BLOOD PRESSURE: 64 MMHG | HEART RATE: 60 BPM | RESPIRATION RATE: 16 BRPM | SYSTOLIC BLOOD PRESSURE: 157 MMHG | OXYGEN SATURATION: 95 % | TEMPERATURE: 98 F

## 2024-04-22 DIAGNOSIS — Z79.01 LONG TERM (CURRENT) USE OF ANTICOAGULANTS: ICD-10-CM

## 2024-04-22 DIAGNOSIS — Z91.041 RADIOGRAPHIC DYE ALLERGY STATUS: ICD-10-CM

## 2024-04-22 DIAGNOSIS — R22.42 LOCALIZED SWELLING, MASS AND LUMP, LEFT LOWER LIMB: ICD-10-CM

## 2024-04-22 DIAGNOSIS — Z88.0 ALLERGY STATUS TO PENICILLIN: ICD-10-CM

## 2024-04-22 DIAGNOSIS — I10 ESSENTIAL (PRIMARY) HYPERTENSION: ICD-10-CM

## 2024-04-22 DIAGNOSIS — G20.A1 PARKINSON'S DISEASE WITHOUT DYSKINESIA, WITHOUT MENTION OF FLUCTUATIONS: ICD-10-CM

## 2024-04-22 DIAGNOSIS — E78.5 HYPERLIPIDEMIA, UNSPECIFIED: ICD-10-CM

## 2024-04-22 DIAGNOSIS — R60.0 LOCALIZED EDEMA: ICD-10-CM

## 2024-04-22 DIAGNOSIS — I48.91 UNSPECIFIED ATRIAL FIBRILLATION: ICD-10-CM

## 2024-04-22 PROCEDURE — 93971 EXTREMITY STUDY: CPT | Mod: 26,LT

## 2024-04-22 PROCEDURE — 93971 EXTREMITY STUDY: CPT | Mod: LT

## 2024-04-22 PROCEDURE — 99284 EMERGENCY DEPT VISIT MOD MDM: CPT

## 2024-04-22 PROCEDURE — 99284 EMERGENCY DEPT VISIT MOD MDM: CPT | Mod: 25

## 2024-04-22 NOTE — ED PROVIDER NOTE - PHYSICAL EXAMINATION
Const: Well appearing, NAD  Eyes: PERRL, EOM intact  CV: RRR, no murmurs, no chest wall tenderness, distal pulses intact  Resp: CTAB, normal resp effort  GI: soft, nondistended, nontender. No CVA tenderness  MSK: Full ROM, no muscle or bony deformity or tenderness. +b/l LE edema, left worse than right.  Neuro: AOx3, GCS 15, No focal deficits  Skin: No rash, laceration or abrasion  Psych: calm, cooperative.

## 2024-04-22 NOTE — ED PROVIDER NOTE - CLINICAL SUMMARY MEDICAL DECISION MAKING FREE TEXT BOX
89 yo female with baseline LE edema, now with left>right. Patient's family brought in to r/o DVT. Family requesting no extensive work up be done, only doppler for DVT given patient's age and history. Doppler showing no DVT but showing small fluid collection in calf. Unclear etiology. No sings of infection and patient is NVI. I discussed findings with family including daughter and granddaughter. They understand I cannot be sure what the collection is but given her sx of swelling and normal NVI, they prefer to take patient outpatient for f.u. Stable for dc. Patient and family understand return precautions and f/u.

## 2024-04-22 NOTE — ED PROVIDER NOTE - PATIENT PORTAL LINK FT
You can access the FollowMyHealth Patient Portal offered by [facility] by registering at the following website: [email or web address]. By joining [facility]’s FollowMyHealth portal, you will also be able to view your health information using other applications (apps) compatible with our system.

## 2024-04-22 NOTE — ED PROVIDER NOTE - SOCIAL CONCERNS
Subjective:      Patient ID: Ab Stephens is a 68 y.o. male. HPI  Has back pain and he sees his back MD and was told to have arthritis and has pain in hands and right wrist and fingers  Some times has suddenly her fingers stiffen up and toe stiffen and pops out and may last until he straightens them. Hasno am stiffness and at times when he gets up in am feels numbness below left knee down to foot and lasts for 15-20 minutes. Gets better after he walks and rest of the day  Review of Systems   Gastrointestinal: Positive for constipation. Genitourinary: Negative. Neurological: Negative for dizziness, light-headedness and headaches. Psychiatric/Behavioral: Behavioral problem: has no sigsn of inflammatory arthritis. Objective:   Physical Exam  Gas no edema of legs  Has no weakness of muscles in his lower extremities and reflexes are not felt in both LES  Has full rom both wrists and hand joints  And has no weakness of hands  Has no joint swelling deformity or tenderness of hand joints on both sides  Tinel's is negative both sides.   Assessment:      Has no signs of inflammatory arthritis and his numbness in left leg may be from lumbar spine cause or from neuropathy    Has frequent muscle spasms in legs   Plan:      Discussed with him and advised to follow up with his spine specialist  Can try drinking tonic water 2 times daily
None

## 2024-04-22 NOTE — ED ADULT TRIAGE NOTE - CHIEF COMPLAINT QUOTE
pt presents to Ed with complaints of left leg swelling since yesterday. per EMS, b/l swelling is chronic, but left worse than right today.

## 2024-04-22 NOTE — ED PROVIDER NOTE - OBJECTIVE STATEMENT
89 y/o female with a PMHx of Afib on Eliquis, HTN, HLD, ICH, Parkinsons presents to the ED c/o worsening LLE swelling since yesterday. Granddaughter at bedside reports pt has chronic b/l LE edema. Family noticed increasing LLE swelling yesterday and pt brought to ED to r/o DVT. No other complaints at this time.

## 2024-05-05 NOTE — ED ADULT TRIAGE NOTE - HEIGHT IN INCHES
CC:   Chief Complaint   Patient presents with    Ear Problem     Ear pain left, sharp and dull. Pain when moving.         Established Patient     SUBJECTIVE  HPI:Raisa Mathur is a 46 year old female who presents today with     Ear Problem   There is pain in the left ear. The current episode started 1 to 4 weeks ago. The problem has been waxing and waning. There has been no fever.       His past medical history is significant for:  Past Medical History:   Diagnosis Date    Anxiety     Essential (primary) hypertension        Allergies: ALLERGIES:  No Known Allergies    Current Outpatient Medications   Medication Sig Dispense Refill    hydroCHLOROthiazide 25 MG tablet TAKE 1 TABLET DAILY 90 tablet 0    sertraline (ZOLOFT) 50 MG tablet TAKE 1 TABLET DAILY 90 tablet 3    cetirizine (ZyrTEC) 10 MG tablet Take 10 mg by mouth daily.      hydroCORTisone (ANUSOL-HC) 25 MG suppository Place 1 suppository rectally in the morning and 1 suppository in the evening. 30 suppository 0    azelastine (ASTELIN) 0.1 % nasal spray Spray 1 spray in each nostril in the morning and 1 spray in the evening. Use in each nostril as directed 30 mL 0     No current facility-administered medications for this visit.         Review of Systems:  Review of Systems   HENT:  Positive for ear pain.    All other systems reviewed and are negative.      All other systems reviewed are negative    OBJECTIVE  Vitals: Visit Vitals  BP (!) 144/106   Pulse (!) 102   Temp 98.2 °F (36.8 °C)   Resp (!) 22   Wt 117.9 kg (260 lb)   LMP 05/05/2024 (Exact Date)   SpO2 99%   BMI 44.63 kg/m²       Physical Exam:  Physical Exam  Vitals and nursing note reviewed.   Constitutional:       General: She is not in acute distress.     Appearance: She is well-developed.   HENT:      Right Ear: Tympanic membrane, ear canal and external ear normal.      Left Ear: Tympanic membrane, ear canal and external ear normal.      Nose: Mucosal edema present.      Neck: Normal range of  motion.   Eyes:      Conjunctiva/sclera: Conjunctivae normal.   Cardiovascular:      Rate and Rhythm: Normal rate and regular rhythm.      Heart sounds: Normal heart sounds.   Pulmonary:      Effort: Pulmonary effort is normal.      Breath sounds: Normal breath sounds.   Lymphadenopathy:      Cervical: No cervical adenopathy.         ASSESSMENT/PLAN  Left otalgia-no sign of infection  HTN-BP high today.  Patient did not take her medication last night.  Advised patient to monitor at home and follow up with her pcp  If symptoms should suddenly change or worsen, seek immediate reevaluation with PCP or return to [facility].  The patient verbalized understanding.    Derrick Valdes MD  5/5/2024       6

## 2024-07-10 NOTE — CHART NOTE - NSCHARTNOTESELECT_GEN_ALL_CORE
Code Stroke/Event Note
General Sunscreen Counseling: I recommended a broad spectrum sunscreen with a SPF of 30 or higher.  I explained that SPF 30 sunscreens block approximately 97 percent of the sun's harmful rays.  Sunscreens should be applied at least 15 minutes prior to expected sun exposure and then every 2 hours after that as long as sun exposure continues. If swimming or exercising sunscreen should be reapplied every 45 minutes to an hour after getting wet or sweating.  One ounce, or the equivalent of a shot glass full of sunscreen, is adequate to protect the skin not covered by a bathing suit. I also recommended a lip balm with a sunscreen as well. Sun protective clothing can be used in lieu of sunscreen but must be worn the entire time you are exposed to the sun's rays.
Products Recommended: Zinc sunscreen, Elta MD UV, JUNIOR [place]court Zinc, Ese Darby Posay, lip balm with SPF
Detail Level: Generalized

## 2024-07-31 ENCOUNTER — INPATIENT (INPATIENT)
Facility: HOSPITAL | Age: 89
LOS: 3 days | Discharge: HOME | DRG: 690 | End: 2024-08-04
Attending: INTERNAL MEDICINE | Admitting: INTERNAL MEDICINE
Payer: MEDICARE

## 2024-07-31 VITALS
DIASTOLIC BLOOD PRESSURE: 56 MMHG | WEIGHT: 149.91 LBS | HEART RATE: 73 BPM | SYSTOLIC BLOOD PRESSURE: 150 MMHG | OXYGEN SATURATION: 94 % | TEMPERATURE: 98 F | RESPIRATION RATE: 18 BRPM

## 2024-07-31 DIAGNOSIS — E44.0 MODERATE PROTEIN-CALORIE MALNUTRITION: ICD-10-CM

## 2024-07-31 DIAGNOSIS — R41.0 DISORIENTATION, UNSPECIFIED: ICD-10-CM

## 2024-07-31 DIAGNOSIS — J90 PLEURAL EFFUSION, NOT ELSEWHERE CLASSIFIED: ICD-10-CM

## 2024-07-31 DIAGNOSIS — Z16.24 RESISTANCE TO MULTIPLE ANTIBIOTICS: ICD-10-CM

## 2024-07-31 DIAGNOSIS — N12 TUBULO-INTERSTITIAL NEPHRITIS, NOT SPECIFIED AS ACUTE OR CHRONIC: ICD-10-CM

## 2024-07-31 DIAGNOSIS — Z74.01 BED CONFINEMENT STATUS: ICD-10-CM

## 2024-07-31 DIAGNOSIS — G93.41 METABOLIC ENCEPHALOPATHY: ICD-10-CM

## 2024-07-31 DIAGNOSIS — Z79.890 HORMONE REPLACEMENT THERAPY: ICD-10-CM

## 2024-07-31 DIAGNOSIS — G20.A1 PARKINSON'S DISEASE WITHOUT DYSKINESIA, WITHOUT MENTION OF FLUCTUATIONS: ICD-10-CM

## 2024-07-31 DIAGNOSIS — Z88.0 ALLERGY STATUS TO PENICILLIN: ICD-10-CM

## 2024-07-31 DIAGNOSIS — F02.811 DEMENTIA IN OTHER DISEASES CLASSIFIED ELSEWHERE, UNSPECIFIED SEVERITY, WITH AGITATION: ICD-10-CM

## 2024-07-31 DIAGNOSIS — I48.91 UNSPECIFIED ATRIAL FIBRILLATION: ICD-10-CM

## 2024-07-31 DIAGNOSIS — Z91.041 RADIOGRAPHIC DYE ALLERGY STATUS: ICD-10-CM

## 2024-07-31 DIAGNOSIS — E87.6 HYPOKALEMIA: ICD-10-CM

## 2024-07-31 DIAGNOSIS — I48.20 CHRONIC ATRIAL FIBRILLATION, UNSPECIFIED: ICD-10-CM

## 2024-07-31 DIAGNOSIS — Z99.3 DEPENDENCE ON WHEELCHAIR: ICD-10-CM

## 2024-07-31 DIAGNOSIS — I10 ESSENTIAL (PRIMARY) HYPERTENSION: ICD-10-CM

## 2024-07-31 DIAGNOSIS — Z79.01 LONG TERM (CURRENT) USE OF ANTICOAGULANTS: ICD-10-CM

## 2024-07-31 LAB
ALBUMIN SERPL ELPH-MCNC: 2.9 G/DL — LOW (ref 3.3–5)
ALP SERPL-CCNC: 104 U/L — SIGNIFICANT CHANGE UP (ref 40–120)
ALT FLD-CCNC: 12 U/L — SIGNIFICANT CHANGE UP (ref 12–78)
ANION GAP SERPL CALC-SCNC: 8 MMOL/L — SIGNIFICANT CHANGE UP (ref 5–17)
APPEARANCE UR: ABNORMAL
APTT BLD: 40.9 SEC — HIGH (ref 24.5–35.6)
AST SERPL-CCNC: 23 U/L — SIGNIFICANT CHANGE UP (ref 15–37)
BACTERIA # UR AUTO: ABNORMAL /HPF
BASOPHILS # BLD AUTO: 0.03 K/UL — SIGNIFICANT CHANGE UP (ref 0–0.2)
BASOPHILS NFR BLD AUTO: 0.2 % — SIGNIFICANT CHANGE UP (ref 0–2)
BILIRUB SERPL-MCNC: 0.9 MG/DL — SIGNIFICANT CHANGE UP (ref 0.2–1.2)
BILIRUB UR-MCNC: NEGATIVE — SIGNIFICANT CHANGE UP
BUN SERPL-MCNC: 22 MG/DL — SIGNIFICANT CHANGE UP (ref 7–23)
CALCIUM SERPL-MCNC: 9.2 MG/DL — SIGNIFICANT CHANGE UP (ref 8.5–10.1)
CAST: 3 /LPF — SIGNIFICANT CHANGE UP (ref 0–4)
CHLORIDE SERPL-SCNC: 106 MMOL/L — SIGNIFICANT CHANGE UP (ref 96–108)
CO2 SERPL-SCNC: 25 MMOL/L — SIGNIFICANT CHANGE UP (ref 22–31)
COLOR SPEC: SIGNIFICANT CHANGE UP
CREAT SERPL-MCNC: 0.72 MG/DL — SIGNIFICANT CHANGE UP (ref 0.5–1.3)
DIFF PNL FLD: NEGATIVE — SIGNIFICANT CHANGE UP
EGFR: 78 ML/MIN/1.73M2 — SIGNIFICANT CHANGE UP
EOSINOPHIL # BLD AUTO: 0.08 K/UL — SIGNIFICANT CHANGE UP (ref 0–0.5)
EOSINOPHIL NFR BLD AUTO: 0.6 % — SIGNIFICANT CHANGE UP (ref 0–6)
GLUCOSE SERPL-MCNC: 116 MG/DL — HIGH (ref 70–99)
GLUCOSE UR QL: NEGATIVE MG/DL — SIGNIFICANT CHANGE UP
HCT VFR BLD CALC: 35.5 % — SIGNIFICANT CHANGE UP (ref 34.5–45)
HGB BLD-MCNC: 12.1 G/DL — SIGNIFICANT CHANGE UP (ref 11.5–15.5)
IMM GRANULOCYTES NFR BLD AUTO: 0.2 % — SIGNIFICANT CHANGE UP (ref 0–0.9)
INR BLD: 1.29 RATIO — HIGH (ref 0.85–1.18)
KETONES UR-MCNC: ABNORMAL MG/DL
LACTATE SERPL-SCNC: 1.8 MMOL/L — SIGNIFICANT CHANGE UP (ref 0.7–2)
LEUKOCYTE ESTERASE UR-ACNC: ABNORMAL
LYMPHOCYTES # BLD AUTO: 1.08 K/UL — SIGNIFICANT CHANGE UP (ref 1–3.3)
LYMPHOCYTES # BLD AUTO: 7.6 % — LOW (ref 13–44)
MCHC RBC-ENTMCNC: 31.1 PG — SIGNIFICANT CHANGE UP (ref 27–34)
MCHC RBC-ENTMCNC: 34.1 GM/DL — SIGNIFICANT CHANGE UP (ref 32–36)
MCV RBC AUTO: 91.3 FL — SIGNIFICANT CHANGE UP (ref 80–100)
MONOCYTES # BLD AUTO: 0.61 K/UL — SIGNIFICANT CHANGE UP (ref 0–0.9)
MONOCYTES NFR BLD AUTO: 4.3 % — SIGNIFICANT CHANGE UP (ref 2–14)
NEUTROPHILS # BLD AUTO: 12.38 K/UL — HIGH (ref 1.8–7.4)
NEUTROPHILS NFR BLD AUTO: 87.1 % — HIGH (ref 43–77)
NITRITE UR-MCNC: POSITIVE
PH UR: 5.5 — SIGNIFICANT CHANGE UP (ref 5–8)
PLATELET # BLD AUTO: 247 K/UL — SIGNIFICANT CHANGE UP (ref 150–400)
POTASSIUM SERPL-MCNC: 3.9 MMOL/L — SIGNIFICANT CHANGE UP (ref 3.5–5.3)
POTASSIUM SERPL-SCNC: 3.9 MMOL/L — SIGNIFICANT CHANGE UP (ref 3.5–5.3)
PROT SERPL-MCNC: 7 GM/DL — SIGNIFICANT CHANGE UP (ref 6–8.3)
PROT UR-MCNC: SIGNIFICANT CHANGE UP MG/DL
PROTHROM AB SERPL-ACNC: 14.5 SEC — HIGH (ref 9.5–13)
RBC # BLD: 3.89 M/UL — SIGNIFICANT CHANGE UP (ref 3.8–5.2)
RBC # FLD: 14.8 % — HIGH (ref 10.3–14.5)
RBC CASTS # UR COMP ASSIST: 1 /HPF — SIGNIFICANT CHANGE UP (ref 0–4)
SODIUM SERPL-SCNC: 139 MMOL/L — SIGNIFICANT CHANGE UP (ref 135–145)
SP GR SPEC: 1.02 — SIGNIFICANT CHANGE UP (ref 1–1.03)
SQUAMOUS # UR AUTO: 14 /HPF — HIGH (ref 0–5)
UROBILINOGEN FLD QL: 0.2 MG/DL — SIGNIFICANT CHANGE UP (ref 0.2–1)
WBC # BLD: 14.21 K/UL — HIGH (ref 3.8–10.5)
WBC # FLD AUTO: 14.21 K/UL — HIGH (ref 3.8–10.5)
WBC UR QL: 96 /HPF — HIGH (ref 0–5)

## 2024-07-31 PROCEDURE — 74177 CT ABD & PELVIS W/CONTRAST: CPT | Mod: 26,MC

## 2024-07-31 PROCEDURE — 99285 EMERGENCY DEPT VISIT HI MDM: CPT

## 2024-07-31 NOTE — ED PROVIDER NOTE - OBJECTIVE STATEMENT
89-year-old female PMH Parkinson's disease, ICH, A-fib, presents to the emergency department for UTI.  Patient reportedly started having urinary symptoms around 7/4, has completed 2 courses of antibiotics without improvement in her symptoms.  Sent in by primary care doctor due to resistant UTI.  Family states they believe she has had drug-resistant UTIs in the past, has required hospitalization at least once.  They deny fever, chills, nausea, vomiting, or diarrhea. Family also notes the patient's been a little bit more disoriented from her baseline.

## 2024-07-31 NOTE — ED ADULT TRIAGE NOTE - CHIEF COMPLAINT QUOTE
Pt BIBEMS from home c/o altered mental status. Pt's daughter at bedside that pt is currently being treated for a UTI with cipro and was told to come in by PCP for IV antibiotics due to increase in AMS. Pt A&Ox2. Denies any pain, fevers, n/v/d. -BEFAST. PMHx dementia

## 2024-07-31 NOTE — ED PROVIDER NOTE - CLINICAL SUMMARY MEDICAL DECISION MAKING FREE TEXT BOX
89-year-old female with resistant UTI despite 2 antibiotic courses (Cipro and nitrofurantoin).  Family also notes patient more disoriented than normal.  Patient overall appears well, vital signs do not meet SIRS criteria.  Patient has mild diffuse abdominal tenderness.  Will evaluate for UTI, Juwan, septic stone, other intra-abdominal process.  Plan for labs, CT abdomen, urinalysis, symptomatic treatment, reassess.

## 2024-07-31 NOTE — ED PROVIDER NOTE - PHYSICAL EXAMINATION
GENERAL: awake and alert, non-toxic appearing, no acute distress  HEENT: NCAT, EOMI, oral mucosa moist, normal conjunctiva  RESP: CTAB, no respiratory distress, no wheezes/rhonchi/rales  CV: RRR, no murmurs/rubs/gallops  ABDOMEN: soft, mild tenderness diffusely, non-distended, no guarding, no rebound tenderness  MSK: no visible deformities  NEURO: no focal sensory or motor deficits  SKIN: warm, normal color, well perfused, no rash

## 2024-08-01 DIAGNOSIS — I48.91 UNSPECIFIED ATRIAL FIBRILLATION: ICD-10-CM

## 2024-08-01 DIAGNOSIS — N39.0 URINARY TRACT INFECTION, SITE NOT SPECIFIED: ICD-10-CM

## 2024-08-01 DIAGNOSIS — G20 PARKINSON'S DISEASE: ICD-10-CM

## 2024-08-01 DIAGNOSIS — G93.41 METABOLIC ENCEPHALOPATHY: ICD-10-CM

## 2024-08-01 PROCEDURE — 99223 1ST HOSP IP/OBS HIGH 75: CPT

## 2024-08-01 PROCEDURE — 93010 ELECTROCARDIOGRAM REPORT: CPT

## 2024-08-01 PROCEDURE — 36415 COLL VENOUS BLD VENIPUNCTURE: CPT

## 2024-08-01 PROCEDURE — 97163 PT EVAL HIGH COMPLEX 45 MIN: CPT | Mod: GP

## 2024-08-01 PROCEDURE — 85027 COMPLETE CBC AUTOMATED: CPT

## 2024-08-01 PROCEDURE — 80048 BASIC METABOLIC PNL TOTAL CA: CPT

## 2024-08-01 RX ORDER — APIXABAN 5 MG/1
2.5 TABLET, FILM COATED ORAL EVERY 12 HOURS
Refills: 0 | Status: DISCONTINUED | OUTPATIENT
Start: 2024-08-01 | End: 2024-08-04

## 2024-08-01 RX ORDER — MEROPENEM 1 G/20ML
1000 INJECTION, POWDER, FOR SOLUTION INTRAVENOUS EVERY 8 HOURS
Refills: 0 | Status: DISCONTINUED | OUTPATIENT
Start: 2024-08-01 | End: 2024-08-01

## 2024-08-01 RX ORDER — CARBIDOPA AND LEVODOPA 25; 100 MG/1; MG/1
1 TABLET ORAL THREE TIMES A DAY
Refills: 0 | Status: DISCONTINUED | OUTPATIENT
Start: 2024-08-01 | End: 2024-08-01

## 2024-08-01 RX ORDER — ONDANSETRON HCL/PF 4 MG/2 ML
4 VIAL (ML) INJECTION EVERY 8 HOURS
Refills: 0 | Status: DISCONTINUED | OUTPATIENT
Start: 2024-08-01 | End: 2024-08-04

## 2024-08-01 RX ORDER — AMLODIPINE BESYLATE 2.5 MG/1
5 TABLET ORAL DAILY
Refills: 0 | Status: DISCONTINUED | OUTPATIENT
Start: 2024-08-01 | End: 2024-08-04

## 2024-08-01 RX ORDER — RASAGILINE MESYLATE 0.5 MG/1
0.5 TABLET ORAL DAILY
Refills: 0 | Status: DISCONTINUED | OUTPATIENT
Start: 2024-08-01 | End: 2024-08-01

## 2024-08-01 RX ORDER — CARBIDOPA AND LEVODOPA 25; 100 MG/1; MG/1
1 TABLET ORAL
Refills: 0 | Status: DISCONTINUED | OUTPATIENT
Start: 2024-08-01 | End: 2024-08-04

## 2024-08-01 RX ORDER — FLUOXETINE HCL 10 MG
20 CAPSULE ORAL DAILY
Refills: 0 | Status: DISCONTINUED | OUTPATIENT
Start: 2024-08-01 | End: 2024-08-01

## 2024-08-01 RX ORDER — APIXABAN 5 MG/1
1 TABLET, FILM COATED ORAL
Refills: 0 | DISCHARGE

## 2024-08-01 RX ORDER — LEVOTHYROXINE SODIUM 175 MCG
125 TABLET ORAL DAILY
Refills: 0 | Status: DISCONTINUED | OUTPATIENT
Start: 2024-08-01 | End: 2024-08-04

## 2024-08-01 RX ORDER — MEROPENEM 1 G/20ML
1000 INJECTION, POWDER, FOR SOLUTION INTRAVENOUS EVERY 8 HOURS
Refills: 0 | Status: DISCONTINUED | OUTPATIENT
Start: 2024-08-01 | End: 2024-08-04

## 2024-08-01 RX ORDER — CARBIDOPA AND LEVODOPA 25; 100 MG/1; MG/1
1 TABLET ORAL
Refills: 0 | DISCHARGE

## 2024-08-01 RX ORDER — CLONAZEPAM 0.5 MG/1
1 TABLET ORAL
Refills: 0 | DISCHARGE

## 2024-08-01 RX ORDER — MELATONIN 3 MG
3 TABLET ORAL AT BEDTIME
Refills: 0 | Status: DISCONTINUED | OUTPATIENT
Start: 2024-08-01 | End: 2024-08-04

## 2024-08-01 RX ORDER — TRAMADOL HCL 50 MG
1 TABLET ORAL
Refills: 0 | DISCHARGE

## 2024-08-01 RX ORDER — ATORVASTATIN CALCIUM 40 MG/1
20 TABLET, FILM COATED ORAL AT BEDTIME
Refills: 0 | Status: DISCONTINUED | OUTPATIENT
Start: 2024-08-01 | End: 2024-08-04

## 2024-08-01 RX ORDER — CLONAZEPAM 0.5 MG/1
1 TABLET ORAL DAILY
Refills: 0 | Status: DISCONTINUED | OUTPATIENT
Start: 2024-08-01 | End: 2024-08-04

## 2024-08-01 RX ORDER — TRAMADOL HCL 50 MG
50 TABLET ORAL
Refills: 0 | Status: DISCONTINUED | OUTPATIENT
Start: 2024-08-01 | End: 2024-08-04

## 2024-08-01 RX ORDER — LEVOTHYROXINE SODIUM 175 MCG
1 TABLET ORAL
Refills: 0 | DISCHARGE

## 2024-08-01 RX ORDER — ACETAMINOPHEN 500 MG
650 TABLET ORAL EVERY 6 HOURS
Refills: 0 | Status: DISCONTINUED | OUTPATIENT
Start: 2024-08-01 | End: 2024-08-04

## 2024-08-01 RX ORDER — METOPROLOL TARTRATE 100 MG
1 TABLET ORAL
Refills: 0 | DISCHARGE

## 2024-08-01 RX ORDER — APIXABAN 5 MG/1
2.5 TABLET, FILM COATED ORAL ONCE
Refills: 0 | Status: COMPLETED | OUTPATIENT
Start: 2024-08-01 | End: 2024-08-01

## 2024-08-01 RX ORDER — METOPROLOL TARTRATE 100 MG
25 TABLET ORAL DAILY
Refills: 0 | Status: DISCONTINUED | OUTPATIENT
Start: 2024-08-01 | End: 2024-08-04

## 2024-08-01 RX ORDER — METOPROLOL TARTRATE 100 MG
12.5 TABLET ORAL AT BEDTIME
Refills: 0 | Status: DISCONTINUED | OUTPATIENT
Start: 2024-08-01 | End: 2024-08-04

## 2024-08-01 RX ORDER — AMANTADINE HYDROCHLORIDE 100 MG/1
100 TABLET ORAL EVERY 12 HOURS
Refills: 0 | Status: DISCONTINUED | OUTPATIENT
Start: 2024-08-01 | End: 2024-08-01

## 2024-08-01 RX ORDER — FUROSEMIDE 10 MG/ML
1 INJECTION, SOLUTION INTRAVENOUS
Refills: 0 | DISCHARGE

## 2024-08-01 RX ORDER — ATORVASTATIN CALCIUM 40 MG/1
20 TABLET, FILM COATED ORAL AT BEDTIME
Refills: 0 | Status: DISCONTINUED | OUTPATIENT
Start: 2024-08-01 | End: 2024-08-01

## 2024-08-01 RX ORDER — SERTRALINE HYDROCHLORIDE 100 MG/1
1 TABLET, FILM COATED ORAL
Refills: 0 | DISCHARGE

## 2024-08-01 RX ORDER — ONDANSETRON HCL/PF 4 MG/2 ML
4 VIAL (ML) INJECTION EVERY 6 HOURS
Refills: 0 | Status: DISCONTINUED | OUTPATIENT
Start: 2024-08-01 | End: 2024-08-04

## 2024-08-01 RX ORDER — SERTRALINE HYDROCHLORIDE 100 MG/1
50 TABLET, FILM COATED ORAL DAILY
Refills: 0 | Status: DISCONTINUED | OUTPATIENT
Start: 2024-08-01 | End: 2024-08-04

## 2024-08-01 RX ORDER — BACTERIOSTATIC SODIUM CHLORIDE 0.9 %
1000 VIAL (ML) INJECTION
Refills: 0 | Status: DISCONTINUED | OUTPATIENT
Start: 2024-08-01 | End: 2024-08-03

## 2024-08-01 RX ORDER — ATORVASTATIN CALCIUM 40 MG/1
20 TABLET, FILM COATED ORAL ONCE
Refills: 0 | Status: COMPLETED | OUTPATIENT
Start: 2024-08-01 | End: 2024-08-01

## 2024-08-01 RX ORDER — MAGNESIUM, ALUMINUM HYDROXIDE 200-225/5
30 SUSPENSION, ORAL (FINAL DOSE FORM) ORAL EVERY 4 HOURS
Refills: 0 | Status: DISCONTINUED | OUTPATIENT
Start: 2024-08-01 | End: 2024-08-04

## 2024-08-01 RX ADMIN — Medication 3 MILLIGRAM(S): at 21:38

## 2024-08-01 RX ADMIN — Medication 50 MILLIGRAM(S): at 21:37

## 2024-08-01 RX ADMIN — Medication 50 MILLIGRAM(S): at 05:57

## 2024-08-01 RX ADMIN — MEROPENEM 1000 MILLIGRAM(S): 1 INJECTION, POWDER, FOR SOLUTION INTRAVENOUS at 21:37

## 2024-08-01 RX ADMIN — APIXABAN 2.5 MILLIGRAM(S): 5 TABLET, FILM COATED ORAL at 05:57

## 2024-08-01 RX ADMIN — Medication 75 MILLILITER(S): at 21:38

## 2024-08-01 RX ADMIN — MEROPENEM 1000 MILLIGRAM(S): 1 INJECTION, POWDER, FOR SOLUTION INTRAVENOUS at 15:23

## 2024-08-01 RX ADMIN — Medication 1000 MILLIGRAM(S): at 00:49

## 2024-08-01 RX ADMIN — Medication 75 MILLILITER(S): at 06:50

## 2024-08-01 RX ADMIN — CARBIDOPA AND LEVODOPA 1 CAPSULE(S): 25; 100 TABLET ORAL at 16:32

## 2024-08-01 RX ADMIN — APIXABAN 2.5 MILLIGRAM(S): 5 TABLET, FILM COATED ORAL at 16:32

## 2024-08-01 RX ADMIN — Medication 650 MILLIGRAM(S): at 08:16

## 2024-08-01 RX ADMIN — Medication 50 MILLIGRAM(S): at 11:18

## 2024-08-01 RX ADMIN — Medication 650 MILLIGRAM(S): at 09:00

## 2024-08-01 RX ADMIN — Medication 12.5 MILLIGRAM(S): at 21:36

## 2024-08-01 RX ADMIN — ATORVASTATIN CALCIUM 20 MILLIGRAM(S): 40 TABLET, FILM COATED ORAL at 05:59

## 2024-08-01 RX ADMIN — ATORVASTATIN CALCIUM 20 MILLIGRAM(S): 40 TABLET, FILM COATED ORAL at 21:36

## 2024-08-01 NOTE — ED ADULT NURSE NOTE - OBJECTIVE STATEMENT
Pt is a 89 y/o female who presents to the ED with c/altered mental status. Pt's daughter at bedside that pt is currently being treated for a UTI with cipro and was told to come in by PCP for IV antibiotics due to increase in AMS. Pt A&Ox2. Denies any pain, fevers, n/v/d. dementia

## 2024-08-01 NOTE — H&P ADULT - PROBLEM SELECTOR PLAN 1
- continue with IV Rocephin  - UA /  Urine culture  - CBC /  BMP - continue with IV Rocephin  - UA /  Urine culture  - CBC /  BMP in the am # Pyelonephritis  # Hx of ESBL Organisms  - continue with IV Rocephin  - UA /  Urine culture  - CBC /  BMP in the am

## 2024-08-01 NOTE — CONSULT NOTE ADULT - SUBJECTIVE AND OBJECTIVE BOX
Patient is a 89y old  Female who presents with a chief complaint of Urinary symptoms (01 Aug 2024 06:59)    HPI:  Patient is a 89-year-old female PMH Parkinson's disease, ICH, A-fib, presents to the emergency department for UTI.  Patient reportedly started having urinary symptoms around 7/4, has completed 2 courses of antibiotics without improvement in her symptoms.  Sent in by primary care doctor due to resistant UTI.  Family states they believe she has had drug-resistant UTIs in the past, has required hospitalization at least once.  They deny fever, chills, nausea, vomiting, or diarrhea. Family also notes the patient's been a little bit more disoriented from her baseline. (01 Aug 2024 06:59)  Above HPI reviewed and reconciled with patient      PAST MEDICAL & SURGICAL HISTORY:  Afib      Parkinson's disease      ICH (intracerebral hemorrhage)      No significant past surgical history        FAMILY HISTORY:    Social Hx:    Allergies  amoxicillin (Unknown)  contrast (Unknown)    ANTIMICROBIALS (past 90 days)  MEDICATIONS  (STANDING):  cefTRIAXone Injectable.   1000 milliGRAM(s) IV Push (24 @ 00:49)        ACTIVE ANTIMICROBIALS    MEDICATIONS  (STANDING):  acetaminophen     Tablet .. 650 every 6 hours PRN  acetaminophen     Tablet .. 650 every 6 hours PRN  aluminum hydroxide/magnesium hydroxide/simethicone Suspension 30 every 4 hours PRN  amLODIPine   Tablet 5 daily  apixaban 2.5 every 12 hours  atorvastatin 20 at bedtime  carbidopa 23.75 mG/levodopa 95 mG ER 1 <User Schedule>  clonazePAM  Tablet 1 daily PRN  levothyroxine 125 daily  melatonin 3 at bedtime PRN  metoprolol tartrate 12.5 at bedtime  metoprolol tartrate 25 daily  ondansetron Injectable 4 every 6 hours PRN  ondansetron Injectable 4 every 8 hours PRN  QUEtiapine 50 at bedtime  sertraline 50 daily  traMADol 50 two times a day PRN      REVIEW OF SYSTEMS  [  ] ROS unobtainable because:    [  ] All other systems negative except as noted below:	    Constitutional:  [ ] fever [ ] chills  [ ] weight loss  [ ] weakness  Skin:  [ ] rash [ ] phlebitis	  Eyes: [ ] icterus [ ] pain  [ ] discharge	  ENMT: [ ] sore throat  [ ] thrush [ ] ulcers [ ] exudates  Respiratory: [ ] dyspnea [ ] hemoptysis [ ] cough [ ] sputum	  Cardiovascular:  [ ] chest pain [ ] palpitations [ ] edema	  Gastrointestinal:  [ ] nausea [ ] vomiting [ ] diarrhea [ ] constipation [ ] pain	  Genitourinary:  [ ] dysuria [ ] frequency [ ] hematuria [ ] discharge [ ] flank pain  [ ] incontinence  Musculoskeletal:  [ ] myalgias [ ] arthralgias [ ] arthritis  [ ] back pain  Neurological:  [ ] headache [ ] seizures  [ ] confusion/altered mental status  Psychiatric:  [ ] anxiety [ ] depression	  Hematology/Lymphatics:  [ ] lymphadenopathy  Endocrine:  [ ] adrenal [ ] thyroid  Allergic/Immunologic:	 [ ] transplant [ ] seasonal    Vital Signs Last 24 Hrs  T(C): 37.4 (01 Aug 2024 09:00), Max: 38 (01 Aug 2024 07:57)  T(F): 99.4 (01 Aug 2024 09:00), Max: 100.4 (01 Aug 2024 07:57)  HR: 71 (01 Aug 2024 08:39) (62 - 77)  BP: 115/50 (01 Aug 2024 08:39) (96/46 - 150/56)  BP(mean): 67 (01 Aug 2024 08:39) (61 - 101)  RR: 14 (01 Aug 2024 08:39) (14 - 18)  SpO2: 94% (01 Aug 2024 08:39) (92% - 96%)    Parameters below as of 01 Aug 2024 08:39  Patient On (Oxygen Delivery Method): room air        Physical Exam:  Constitutional:  well preserved, comfortable  Head/Eyes: no icterus  LUNGS:  CTA  CVS:  regular rhythm  Abd:  soft, non-tender; non-distended  Ext:  no edema  Vascular:  IV site no erythema tenderness or discharge  Neuro: AAO X 3, non- focal    Labs: all available labs reviewed                        12.1   14 )-----------( 247      ( 2024 21:27 )             35.5         139  |  106  |  22  ----------------------------<  116<H>  3.9   |  25  |  0.72    Ca    9.2      2024 22:11    TPro  7.0  /  Alb  2.9<L>  /  TBili  0.9  /  DBili  x   /  AST  23  /  ALT  12  /  AlkPhos  104       LIVER FUNCTIONS - ( 2024 22:11 )  Alb: 2.9 g/dL / Pro: 7.0 gm/dL / ALK PHOS: 104 U/L / ALT: 12 U/L / AST: 23 U/L / GGT: x           Urinalysis Basic - ( 2024 23:00 )    Color: Dark Yellow / Appearance: Cloudy / S.018 / pH: x  Gluc: x / Ketone: Trace mg/dL  / Bili: Negative / Urobili: 0.2 mg/dL   Blood: x / Protein: Trace mg/dL / Nitrite: Positive   Leuk Esterase: Moderate / RBC: 1 /HPF / WBC 96 /HPF   Sq Epi: x / Non Sq Epi: 14 /HPF / Bacteria: Moderate /HPF          Radiology: all available radiological tests reviewed  < from: CT Abdomen and Pelvis w/ IV Cont (24 @ 23:52) >  IMPRESSION:    No hydronephrosis. Mild heterogeneity of the left kidney. Correlate with   urinalysis and lab values to assess for pyelonephritis in appropriate   clinical setting.      1.8 cm well defined cystic lesion in the head/neck of the pancreas   (62:3). No significant ductal dilatation. Consider nonemergent MR.    Trace bilateral pleural effusions with adjacent bibasilar opacities,   likely representing dependent atelectasis. Recommend clinical correlation   to assess for superimposed infection. Consider chest radiographic imaging   follow-up.    < end of copied text >      Advanced directives addressed: full resuscitation Patient is a 89y old  Female who presents with a chief complaint of Urinary symptoms (01 Aug 2024 06:59)    HPI:  Patient is a 89-year-old female PMH Parkinson's disease, ICH, A-fib, presents to the emergency department for UTI.  Patient reportedly started having urinary symptoms around 7/4, has completed 2 courses of antibiotics without improvement in her symptoms.  Sent in by primary care doctor due to resistant UTI.  Family states they believe she has had drug-resistant UTIs in the past, has required hospitalization at least once.  They deny fever, chills, nausea, vomiting, or diarrhea. Family also notes the patient's been a little bit more disoriented from her baseline. (01 Aug 2024 06:59)  Above HPI reviewed and reconciled with patient      PAST MEDICAL & SURGICAL HISTORY:  Afib      Parkinson's disease      ICH (intracerebral hemorrhage)      No significant past surgical history        FAMILY HISTORY:    Social Hx:    Allergies  amoxicillin (Unknown)  contrast (Unknown)    ANTIMICROBIALS (past 90 days)  MEDICATIONS  (STANDING):  cefTRIAXone Injectable.   1000 milliGRAM(s) IV Push (24 @ 00:49)        ACTIVE ANTIMICROBIALS    MEDICATIONS  (STANDING):  acetaminophen     Tablet .. 650 every 6 hours PRN  acetaminophen     Tablet .. 650 every 6 hours PRN  aluminum hydroxide/magnesium hydroxide/simethicone Suspension 30 every 4 hours PRN  amLODIPine   Tablet 5 daily  apixaban 2.5 every 12 hours  atorvastatin 20 at bedtime  carbidopa 23.75 mG/levodopa 95 mG ER 1 <User Schedule>  clonazePAM  Tablet 1 daily PRN  levothyroxine 125 daily  melatonin 3 at bedtime PRN  metoprolol tartrate 12.5 at bedtime  metoprolol tartrate 25 daily  ondansetron Injectable 4 every 6 hours PRN  ondansetron Injectable 4 every 8 hours PRN  QUEtiapine 50 at bedtime  sertraline 50 daily  traMADol 50 two times a day PRN      REVIEW OF SYSTEMS  [  ] ROS unobtainable because:    [ x ] All other systems negative except as noted below:	    Constitutional:  [ ] fever [ ] chills  [ ] weight loss  [x ] weakness  Skin:  [ ] rash [ ] phlebitis	  Eyes: [ ] icterus [ ] pain  [ ] discharge	  ENMT: [ ] sore throat  [ ] thrush [ ] ulcers [ ] exudates  Respiratory: [ ] dyspnea [ ] hemoptysis [ ] cough [ ] sputum	  Cardiovascular:  [ ] chest pain [ ] palpitations [ ] edema	  Gastrointestinal:  [ ] nausea [ ] vomiting [ ] diarrhea [ ] constipation [ ] pain	  Genitourinary:  [ ] dysuria [ ] frequency [ ] hematuria [ ] discharge [ ] flank pain  [ ] incontinence  Musculoskeletal:  [ ] myalgias [ ] arthralgias [ ] arthritis  [ ] back pain  Neurological:  [ ] headache [ ] seizures  [ ] confusion/altered mental status  Psychiatric:  [ ] anxiety [ ] depression	  Hematology/Lymphatics:  [ ] lymphadenopathy  Endocrine:  [ ] adrenal [ ] thyroid  Allergic/Immunologic:	 [ ] transplant [ ] seasonal    Vital Signs Last 24 Hrs  T(C): 37.4 (01 Aug 2024 09:00), Max: 38 (01 Aug 2024 07:57)  T(F): 99.4 (01 Aug 2024 09:00), Max: 100.4 (01 Aug 2024 07:57)  HR: 71 (01 Aug 2024 08:39) (62 - 77)  BP: 115/50 (01 Aug 2024 08:39) (96/46 - 150/56)  BP(mean): 67 (01 Aug 2024 08:39) (61 - 101)  RR: 14 (01 Aug 2024 08:39) (14 - 18)  SpO2: 94% (01 Aug 2024 08:39) (92% - 96%)    Parameters below as of 01 Aug 2024 08:39  Patient On (Oxygen Delivery Method): room air        Physical Exam:  Constitutional:  frail, NAD  Head/Eyes: no icterus  LUNGS:  CTA  CVS:  regular rhythm  Abd:  soft, non-tender; non-distended  Ext:  no edema  Vascular:  IV site no erythema tenderness or discharge  Neuro: AAO X 3, non- focal    Labs: all available labs reviewed                        12.1    )-----------( 247      ( 2024 21:27 )             35.5         139  |  106  |  22  ----------------------------<  116<H>  3.9   |  25  |  0.72    Ca    9.2      2024 22:11    TPro  7.0  /  Alb  2.9<L>  /  TBili  0.9  /  DBili  x   /  AST  23  /  ALT  12  /  AlkPhos  104       LIVER FUNCTIONS - ( 2024 22:11 )  Alb: 2.9 g/dL / Pro: 7.0 gm/dL / ALK PHOS: 104 U/L / ALT: 12 U/L / AST: 23 U/L / GGT: x           Urinalysis Basic - ( 2024 23:00 )    Color: Dark Yellow / Appearance: Cloudy / S.018 / pH: x  Gluc: x / Ketone: Trace mg/dL  / Bili: Negative / Urobili: 0.2 mg/dL   Blood: x / Protein: Trace mg/dL / Nitrite: Positive   Leuk Esterase: Moderate / RBC: 1 /HPF / WBC 96 /HPF   Sq Epi: x / Non Sq Epi: 14 /HPF / Bacteria: Moderate /HPF          Radiology: all available radiological tests reviewed  < from: CT Abdomen and Pelvis w/ IV Cont (24 @ 23:52) >  IMPRESSION:    No hydronephrosis. Mild heterogeneity of the left kidney. Correlate with   urinalysis and lab values to assess for pyelonephritis in appropriate   clinical setting.      1.8 cm well defined cystic lesion in the head/neck of the pancreas   (62:3). No significant ductal dilatation. Consider nonemergent MR.    Trace bilateral pleural effusions with adjacent bibasilar opacities,   likely representing dependent atelectasis. Recommend clinical correlation   to assess for superimposed infection. Consider chest radiographic imaging   follow-up.    < end of copied text >      Advanced directives addressed: full resuscitation

## 2024-08-01 NOTE — PHARMACOTHERAPY INTERVENTION NOTE - COMMENTS
Med history complete, reviewed medications and allergies with patients daughter Kylee and confirmed medication list with doctor first med profile, patients family will bring in their own supply of Rytary 37.5, all medication related questions answered

## 2024-08-01 NOTE — ED ADULT NURSE NOTE - NSFALLHARMRISKINTERV_ED_ALL_ED

## 2024-08-01 NOTE — H&P ADULT - CONVERSATION DETAILS
Care discussed with patient's daughter, who notes that her daughter hawk is patient's HCP - [phone]  Patient's family will be connected again today

## 2024-08-01 NOTE — H&P ADULT - HISTORY OF PRESENT ILLNESS
Patient is a 89-year-old female PMH Parkinson's disease, ICH, A-fib, presents to the emergency department for UTI.  Patient reportedly started having urinary symptoms around 7/4, has completed 2 courses of antibiotics without improvement in her symptoms.  Sent in by primary care doctor due to resistant UTI.  Family states they believe she has had drug-resistant UTIs in the past, has required hospitalization at least once.  They deny fever, chills, nausea, vomiting, or diarrhea. Family also notes the patient's been a little bit more disoriented from her baseline.    Care discussed with family. Meds need to be clarified with the family .  pharmacy Patient is a 89-year-old female PMH Parkinson's disease, ICH, A-fib, presents to the emergency department for UTI.  Patient reportedly started having urinary symptoms around 7/4, has completed 2 courses of antibiotics without improvement in her symptoms.  Sent in by primary care doctor due to resistant UTI.  Family states they believe she has had drug-resistant UTIs in the past, has required hospitalization at least once.  They deny fever, chills, nausea, vomiting, or diarrhea. Family also notes the patient's been a little bit more disoriented from her baseline.    Care discussed with family. Meds need to be clarified with the family .  Care discussed with pharmacy

## 2024-08-01 NOTE — H&P ADULT - NSHPLABSRESULTS_GEN_ALL_CORE
CBC Full  -  ( 2024 21:27 )  WBC Count : 14.21 K/uL  RBC Count : 3.89 M/uL  Hemoglobin : 12.1 g/dL  Hematocrit : 35.5 %  Platelet Count - Automated : 247 K/uL  Mean Cell Volume : 91.3 fl  Mean Cell Hemoglobin : 31.1 pg  Mean Cell Hemoglobin Concentration : 34.1 gm/dL  Auto Neutrophil # : 12.38 K/uL  Auto Lymphocyte # : 1.08 K/uL  Auto Monocyte # : 0.61 K/uL  Auto Eosinophil # : 0.08 K/uL  Auto Basophil # : 0.03 K/uL  Auto Neutrophil % : 87.1 %  Auto Lymphocyte % : 7.6 %  Auto Monocyte % : 4.3 %  Auto Eosinophil % : 0.6 %  Auto Basophil % : 0.2 %    PT/INR - ( 2024 21:27 )   PT: 14.5 sec;   INR: 1.29 ratio         PTT - ( 2024 21:27 )  PTT:40.9 sec  Urinalysis Basic - ( 2024 23:00 )    Color: Dark Yellow / Appearance: Cloudy / S.018 / pH: x  Gluc: x / Ketone: Trace mg/dL  / Bili: Negative / Urobili: 0.2 mg/dL   Blood: x / Protein: Trace mg/dL / Nitrite: Positive   Leuk Esterase: Moderate / RBC: 1 /HPF / WBC 96 /HPF   Sq Epi: x / Non Sq Epi: 14 /HPF / Bacteria: Moderate /HPF          139  |  106  |  22  ----------------------------<  116<H>  3.9   |  25  |  0.72    Ca    9.2      2024 22:11    TPro  7.0  /  Alb  2.9<L>  /  TBili  0.9  /  DBili  x   /  AST  23  /  ALT  12  /  AlkPhos  104

## 2024-08-01 NOTE — ED ADULT NURSE REASSESSMENT NOTE - NS ED NURSE REASSESS COMMENT FT1
Pt received from Valentina EUGENE RN. Pt noted to be warm, rectal temp taken and pt's temp is 100.4. Pt's IV found to be custodial out, New IV access obtained. Pt's diaper and sheets changed. Pt has no other complaints at this time.

## 2024-08-01 NOTE — H&P ADULT - NSHPPHYSICALEXAM_GEN_ALL_CORE
Physical Exam:   GENERAL APPEARANCE:  very deconditioned, frail, chronically sick  T(C): 36.9 (08-01-24 @ 06:10), Max: 36.9 (08-01-24 @ 03:05)  HR: 77 (08-01-24 @ 06:10) (70 - 77)  BP: 139/56 (08-01-24 @ 06:10) (121/47 - 150/56)  RR: 16 (08-01-24 @ 06:10) (16 - 18)  SpO2: 96% (08-01-24 @ 06:10) (92% - 96%)  HEENT:  muscle wasting, missing teeth  Skin: thin /  pale  NECK:  Supple without lymphadenopathy.   HEART:  Regular rate and rhythm. normal S1 and S2, No M/R/G  LUNGS:  Good ins/exp effort, no W/R/R/C  ABDOMEN:  Soft, nontender, nondistended with good bowel sounds heard  EXTREMITIES:  muscle wasting  NEUROLOGICAL:  Gross nonfocal

## 2024-08-01 NOTE — PATIENT PROFILE ADULT - FALL HARM RISK - HARM RISK INTERVENTIONS

## 2024-08-01 NOTE — CONSULT NOTE ADULT - ASSESSMENT
Assessment:  89F with Parkinson's disease, ICH, A-fib presents with concerning for UTI  Reports persistent urinary symptoms despite 2 courses of antibiotic outpatient  Febrile 100.4F on admission  WBC 14  Cr 0.72  UA 96 WBC / 1 RBC / 14 SQE  CT with No hydronephrosis. Mild heterogeneity of the left kidney.   Prior UCx (7/4) and (7/23) showing ESBL Kleb R Fluoro/Bactrim    Antimicrobials:  s/p CTX (8/1)    Impression:     Recommendations:        Clinical team may change from intravenous to oral antibiotics when the following criteria are met:   1. Patient is clinically improving/stable       a)	Improved signs and symptoms of infection from initial presentation       b)	Afebrile for 24 hours       c)	Leukocytosis trending towards normal range   2. Patient is tolerating oral intake   3. Initial/repeat blood cultures are negative OR do not need to wait for preliminary blood cultures to result    When above criteria met, may change iv antibiotics to: agent, dose, frequency, duration.  Cannot advise changing to oral antibiotic therapy until culture sensitivity is available.        Luis Fernando Alcaraz, DO  Contact on [email or web address]   [facility], [facility]  [address] [place] [address]  Tel: [phone]   Assessment:  89F with Parkinson's disease, ICH, A-fib presents with concerning for UTI  Reports persistent urinary symptoms despite 2 courses of antibiotic outpatient, cipro and ?macrobid  Grandson at bedside reports UTI symptoms often coming as confusion, delirium and some agitation  Patient denies any overt dysuria, urinary frequency, but has some mild suprapubic discomfort  Febrile 100.4F on admission  WBC 14  Cr 0.72  UA 96 WBC / 1 RBC / 14 SQE  CT with No hydronephrosis. Mild heterogeneity of the left kidney.   Prior UCx (7/4) and (7/23) showing ESBL Kleb R Fluoro/Bactrim    Antimicrobials:  s/p CTX (8/1)    Impression:   #Pyelonephritis  #Fever  #Leukocytosis  #Childhood PCN Allergy, unknown reaction  #Hx of ESBL Organisms  - minimal urinary symptoms, but has some suprapubic discomfort, febrile with leukocytosis  - unclear if Parkinson's dementia playing a role in terms of prior confusion at home  - discuss risk of continued antibiotic exposure will breed further resistant organism in the future    Recommendations:  - start Meropenem 1G q8  - monitor temperature curve  - trend WBC  - side effects of antibiotic discussed, tolerating abx well so far  - follow up BCx x2, UCx      Clinical team may change from intravenous to oral antibiotics when the following criteria are met:   1. Patient is clinically improving/stable       a)	Improved signs and symptoms of infection from initial presentation       b)	Afebrile for 24 hours       c)	Leukocytosis trending towards normal range   2. Patient is tolerating oral intake   3. Initial/repeat blood cultures are negative OR do not need to wait for preliminary blood cultures to result    When above criteria met, may change iv antibiotics to: agent, dose, frequency, duration.  Cannot advise changing to oral antibiotic therapy until culture sensitivity is available.        Luis Fernando Alcaraz, DO  Contact on [email or web address]   [facility], [facility]  [address] [place] [address]  Tel: [phone]

## 2024-08-02 LAB
ANION GAP SERPL CALC-SCNC: 5 MMOL/L — SIGNIFICANT CHANGE UP (ref 5–17)
BUN SERPL-MCNC: 16 MG/DL — SIGNIFICANT CHANGE UP (ref 7–23)
CALCIUM SERPL-MCNC: 8.6 MG/DL — SIGNIFICANT CHANGE UP (ref 8.5–10.1)
CHLORIDE SERPL-SCNC: 113 MMOL/L — HIGH (ref 96–108)
CO2 SERPL-SCNC: 25 MMOL/L — SIGNIFICANT CHANGE UP (ref 22–31)
CREAT SERPL-MCNC: 0.71 MG/DL — SIGNIFICANT CHANGE UP (ref 0.5–1.3)
CULTURE RESULTS: SIGNIFICANT CHANGE UP
EGFR: 79 ML/MIN/1.73M2 — SIGNIFICANT CHANGE UP
GLUCOSE SERPL-MCNC: 90 MG/DL — SIGNIFICANT CHANGE UP (ref 70–99)
HCT VFR BLD CALC: 29.3 % — LOW (ref 34.5–45)
HGB BLD-MCNC: 9.5 G/DL — LOW (ref 11.5–15.5)
MCHC RBC-ENTMCNC: 30.4 PG — SIGNIFICANT CHANGE UP (ref 27–34)
MCHC RBC-ENTMCNC: 32.4 GM/DL — SIGNIFICANT CHANGE UP (ref 32–36)
MCV RBC AUTO: 93.6 FL — SIGNIFICANT CHANGE UP (ref 80–100)
PLATELET # BLD AUTO: 197 K/UL — SIGNIFICANT CHANGE UP (ref 150–400)
POTASSIUM SERPL-MCNC: 3.4 MMOL/L — LOW (ref 3.5–5.3)
POTASSIUM SERPL-SCNC: 3.4 MMOL/L — LOW (ref 3.5–5.3)
RBC # BLD: 3.13 M/UL — LOW (ref 3.8–5.2)
RBC # FLD: 15.6 % — HIGH (ref 10.3–14.5)
SODIUM SERPL-SCNC: 143 MMOL/L — SIGNIFICANT CHANGE UP (ref 135–145)
SPECIMEN SOURCE: SIGNIFICANT CHANGE UP
WBC # BLD: 5.62 K/UL — SIGNIFICANT CHANGE UP (ref 3.8–10.5)
WBC # FLD AUTO: 5.62 K/UL — SIGNIFICANT CHANGE UP (ref 3.8–10.5)

## 2024-08-02 PROCEDURE — 99232 SBSQ HOSP IP/OBS MODERATE 35: CPT

## 2024-08-02 RX ORDER — POTASSIUM CHLORIDE 1500 MG/1
20 TABLET, EXTENDED RELEASE ORAL ONCE
Refills: 0 | Status: COMPLETED | OUTPATIENT
Start: 2024-08-02 | End: 2024-08-02

## 2024-08-02 RX ADMIN — APIXABAN 2.5 MILLIGRAM(S): 5 TABLET, FILM COATED ORAL at 17:53

## 2024-08-02 RX ADMIN — MEROPENEM 1000 MILLIGRAM(S): 1 INJECTION, POWDER, FOR SOLUTION INTRAVENOUS at 13:21

## 2024-08-02 RX ADMIN — Medication 75 MILLILITER(S): at 08:29

## 2024-08-02 RX ADMIN — Medication 75 MILLILITER(S): at 22:27

## 2024-08-02 RX ADMIN — CARBIDOPA AND LEVODOPA 1 CAPSULE(S): 25; 100 TABLET ORAL at 15:28

## 2024-08-02 RX ADMIN — MEROPENEM 1000 MILLIGRAM(S): 1 INJECTION, POWDER, FOR SOLUTION INTRAVENOUS at 22:25

## 2024-08-02 RX ADMIN — Medication 50 MILLIGRAM(S): at 13:21

## 2024-08-02 RX ADMIN — Medication 125 MICROGRAM(S): at 06:36

## 2024-08-02 RX ADMIN — Medication 25 MILLIGRAM(S): at 06:36

## 2024-08-02 RX ADMIN — CLONAZEPAM 1 MILLIGRAM(S): 0.5 TABLET ORAL at 20:55

## 2024-08-02 RX ADMIN — Medication 3 MILLIGRAM(S): at 22:25

## 2024-08-02 RX ADMIN — Medication 12.5 MILLIGRAM(S): at 22:25

## 2024-08-02 RX ADMIN — AMLODIPINE BESYLATE 5 MILLIGRAM(S): 2.5 TABLET ORAL at 06:36

## 2024-08-02 RX ADMIN — CARBIDOPA AND LEVODOPA 1 CAPSULE(S): 25; 100 TABLET ORAL at 08:29

## 2024-08-02 RX ADMIN — MEROPENEM 1000 MILLIGRAM(S): 1 INJECTION, POWDER, FOR SOLUTION INTRAVENOUS at 06:36

## 2024-08-02 RX ADMIN — ATORVASTATIN CALCIUM 20 MILLIGRAM(S): 40 TABLET, FILM COATED ORAL at 22:24

## 2024-08-02 RX ADMIN — APIXABAN 2.5 MILLIGRAM(S): 5 TABLET, FILM COATED ORAL at 06:36

## 2024-08-02 RX ADMIN — SERTRALINE HYDROCHLORIDE 50 MILLIGRAM(S): 100 TABLET, FILM COATED ORAL at 13:20

## 2024-08-02 RX ADMIN — Medication 50 MILLIGRAM(S): at 22:25

## 2024-08-02 RX ADMIN — POTASSIUM CHLORIDE 20 MILLIEQUIVALENT(S): 1500 TABLET, EXTENDED RELEASE ORAL at 10:45

## 2024-08-02 NOTE — DIETITIAN INITIAL EVALUATION ADULT - OTHER INFO
89-year-old female PMH Parkinson's disease, ICH, A-fib, presents to the emergency department for UTI. Patient reportedly started having urinary symptoms around 7/4, has completed 2 courses of antibiotics without improvement in her symptoms. Sent in by primary care doctor due to resistant UTI.  Family states they believe she has had drug-resistant UTIs in the past, has required hospitalization at least once. Family also notes the patient's been a little bit more disoriented from her baseline. Admit for UTI.     Unable to obtain detailed diet/ wt hx 2/2 pt [place], states she uses hearing aides but did not have them on at time of visit. Reports difficulty w/ feeding, requires TOTAL FEEDING ASSISTANCE and reports tolerating soft foods only - consider SLP consult to determine least restrictive/ most tolerated diet consistency to maximize PO intake. Reports UBW unknown but states shes likely had wt gain if any changes. RD obtained bed scale wt 141# on 8/2, appears accurate but 1+ edema doc'd - possibly skewing wt and masking potential wt loss. No other wt hx available, ? wt changes. Appears bony and frail. NFPE reveals severe muscle/fat wasting of face and upper body only; neck observed to be contracted. Currently on regular diet, recommend to continue + SLP recs for appropriate consistency diet to maximize caloric and protein intake. Will add ensure plus high protein BID to optimize PO intake (provides 350 kcal, 20g protein/ shake). Labs reviewed: hypokalemia noted, continue to monitor and replete lytes PRN. Strongly recommend to confirm goals of care regarding nutrition support - Nutrition support is not recommended due to overall declining medical status which evidenced based studies indicate EN is not effective in prolonging survival and improving quality of life. It can also increase risk of aspiration pneumonia as well as other related issues (infection, GI complications, and worsening/ non-healing PI's). However, will provide nutrition/ hydration within [facility]. See below for other recs.

## 2024-08-02 NOTE — PROGRESS NOTE ADULT - ASSESSMENT
Assessment:  89F with Parkinson's disease, ICH, A-fib presents with concerning for UTI  Reports persistent urinary symptoms despite 2 courses of antibiotic outpatient, cipro and ?macrobid  Grandson at bedside reports UTI symptoms often coming as confusion, delirium and some agitation  Patient denies any overt dysuria, urinary frequency, but has some mild suprapubic discomfort  Febrile 100.4F on admission  WBC 14  Cr 0.72  UA 96 WBC / 1 RBC / 14 SQE  UCx 7/31 without growth  BCx 7/31 NGTD  CT with No hydronephrosis. Mild heterogeneity of the left kidney.   Prior UCx (7/4) and (7/23) showing ESBL Kleb R Fluoro/Bactrim    Antimicrobials:  s/p CTX (8/1)    Impression:   #UTI  #Fever, resolved  #Leukocytosis, resolved  #Childhood PCN Allergy, unknown reaction  #Hx of ESBL Organisms  - unclear if Parkinson's dementia playing a role in terms of prior confusion at home  - presented with minimal urinary symptoms, but has some suprapubic discomfort, febrile with leukocytosis  - now remains afebrile, with resolution of leukocytosis, seems to be responding with antibiotic  - UCx no growth which is surprising, will have to presume ESBL given prior cultures    Recommendations:  - continue Meropenem 1G q8  - monitor temperature curve  - trend WBC  - side effects of antibiotic discussed, tolerating abx well so far  - if continues to do well clinically, without fevers and leukocytosis, likely can complete a short course 3-days (enddate: 8/1/2024)  - discussed recommendations with primary team    Clinical team may change from intravenous to oral antibiotics when the following criteria are met:   1. Patient is clinically improving/stable       a)	Improved signs and symptoms of infection from initial presentation       b)	Afebrile for 24 hours       c)	Leukocytosis trending towards normal range   2. Patient is tolerating oral intake   3. Initial/repeat blood cultures are negative OR do not need to wait for preliminary blood cultures to result  No good oral options        Luis Fernando Alcaraz, DO  Contact on [employer]   [facility], [facility]  [address] [place] [address]  Tel: [phone]

## 2024-08-02 NOTE — DIETITIAN INITIAL EVALUATION ADULT - PROBLEM SELECTOR PLAN 1
# Pyelonephritis  # Hx of ESBL Organisms  - continue with IV Rocephin  - UA /  Urine culture  - CBC /  BMP in the am

## 2024-08-02 NOTE — DIETITIAN INITIAL EVALUATION ADULT - PERTINENT MEDS FT
MEDICATIONS  (STANDING):  amLODIPine   Tablet 5 milliGRAM(s) Oral daily  apixaban 2.5 milliGRAM(s) Oral every 12 hours  atorvastatin 20 milliGRAM(s) Oral at bedtime  carbidopa 23.75 mG/levodopa 95 mG ER 1 Capsule(s) Oral <User Schedule>  levothyroxine 125 MICROGram(s) Oral daily  meropenem Injectable 1000 milliGRAM(s) IV Push every 8 hours  metoprolol tartrate 25 milliGRAM(s) Oral daily  metoprolol tartrate 12.5 milliGRAM(s) Oral at bedtime  QUEtiapine 50 milliGRAM(s) Oral at bedtime  sertraline 50 milliGRAM(s) Oral daily  sodium chloride 0.9%. 1000 milliLiter(s) (75 mL/Hr) IV Continuous <Continuous>    MEDICATIONS  (PRN):  acetaminophen     Tablet .. 650 milliGRAM(s) Oral every 6 hours PRN Temp greater or equal to 38C (100.4F), Mild Pain (1 - 3)  acetaminophen     Tablet .. 650 milliGRAM(s) Oral every 6 hours PRN Mild Pain (1 - 3)  aluminum hydroxide/magnesium hydroxide/simethicone Suspension 30 milliLiter(s) Oral every 4 hours PRN Dyspepsia  clonazePAM  Tablet 1 milliGRAM(s) Oral daily PRN for anxiety  melatonin 3 milliGRAM(s) Oral at bedtime PRN Insomnia  ondansetron Injectable 4 milliGRAM(s) IV Push every 6 hours PRN Nausea and/or Vomiting  ondansetron Injectable 4 milliGRAM(s) IV Push every 8 hours PRN Nausea and/or Vomiting  traMADol 50 milliGRAM(s) Oral two times a day PRN for severe pain

## 2024-08-02 NOTE — DIETITIAN INITIAL EVALUATION ADULT - PERTINENT LABORATORY DATA
08-02    143  |  113<H>  |  16  ----------------------------<  90  3.4<L>   |  25  |  0.71    Ca    8.6      02 Aug 2024 07:13    TPro  7.0  /  Alb  2.9<L>  /  TBili  0.9  /  DBili  x   /  AST  23  /  ALT  12  /  AlkPhos  104  07-31

## 2024-08-02 NOTE — PHYSICAL THERAPY INITIAL EVALUATION ADULT - GENERAL OBSERVATIONS, REHAB EVAL
O2 2L/min nc; IV; bladder suction; pt rec'd in bed supine; denied pain; daughter / private HHA present

## 2024-08-02 NOTE — CONSULT NOTE ADULT - ASSESSMENT
89 F with HTN , AF, parkinsonism presents with drug resistant UTI    AF- HR controlled on metoprolol-- continue at current dose, continue eliquis for AC     HTN-- well controlled on norvasc    Cholesterol continue lipitor     abx as per medicine / ID      over weekend , please call as needed

## 2024-08-02 NOTE — PROGRESS NOTE ADULT - NUTRITIONAL ASSESSMENT
This patient has been assessed with a concern for Malnutrition and has been determined to have a diagnosis/diagnoses of Moderate protein-calorie malnutrition.    This patient is being managed with:   Diet Regular-  Entered: Aug  1 2024  6:48AM    The following pending diet order is being considered for treatment of Moderate protein-calorie malnutrition:  Diet Regular-  Supplement Feeding Modality:  Oral  Ensure Plus High Protein Cans or Servings Per Day:  1       Frequency:  Two Times a day  Entered: Aug  2 2024 12:27PM

## 2024-08-02 NOTE — PHYSICAL THERAPY INITIAL EVALUATION ADULT - MODALITIES TREATMENT COMMENTS
pt left in bed supine post Eval @ pt request; bed alarm on; O2 2L/min nc, IV, bladder suction in place; heels unloaded on pillow; daughter / private HHA present; kiran well; denied pain; isolation maintained

## 2024-08-02 NOTE — CONSULT NOTE ADULT - SUBJECTIVE AND OBJECTIVE BOX
CHIEF COMPLAINT: Patient is a 89y old  Female who presents with a chief complaint of Urinary symptoms (01 Aug 2024 11:17)      HPI:  Patient is a 89-year-old female PMH Parkinson's disease, ICH, A-fib, presents to the emergency department for UTI.  Patient reportedly started having urinary symptoms around 7/4, has completed 2 courses of antibiotics without improvement in her symptoms.  Sent in by primary care doctor due to resistant UTI.  Family states they believe she has had drug-resistant UTIs in the past, has required hospitalization at least once.  They deny fever, chills, nausea, vomiting, or diarrhea. Family also notes the patient's been a little bit more disoriented from her baseline.    Care discussed with family. Meds need to be clarified with the family .  Care discussed with pharmacy (01 Aug 2024 06:59)      PMHx: PAST MEDICAL & SURGICAL HISTORY:  Afib      Parkinson's disease      ICH (intracerebral hemorrhage)      No significant past surgical history            Soc Hx:  lives with family      Allergies: Allergies    amoxicillin (Unknown)  contrast (Unknown)    Intolerances        Vital Signs Last 24 Hrs  T(C): 36.8 (02 Aug 2024 06:26), Max: 38 (01 Aug 2024 07:57)  T(F): 98.2 (02 Aug 2024 06:26), Max: 100.4 (01 Aug 2024 07:57)  HR: 55 (02 Aug 2024 06:26) (55 - 79)  BP: 118/48 (02 Aug 2024 06:26) (96/46 - 136/60)  BP(mean): 68 (02 Aug 2024 06:26) (61 - 96)  RR: 18 (02 Aug 2024 06:26) (14 - 18)  SpO2: 99% (02 Aug 2024 06:26) (93% - 99%)    Parameters below as of 02 Aug 2024 06:26  Patient On (Oxygen Delivery Method): nasal cannula  O2 Flow (L/min): 2      I&O's Summary          PHYSICAL EXAM:   Constitutional: NAD,   Neck: Soft and supple, No LAD, No JVD  Respiratory: Breath sounds are clear bilaterally, No wheezing, rales or rhonchi  Cardiovascular: S1 and S2, irregular rhythm,   Extremities: No peripheral edema  Vascular: 2+ peripheral pulses  Neurological: A/O x 3, no focal deficits      MEDICATIONS:  MEDICATIONS  (STANDING):  amLODIPine   Tablet 5 milliGRAM(s) Oral daily  apixaban 2.5 milliGRAM(s) Oral every 12 hours  atorvastatin 20 milliGRAM(s) Oral at bedtime  carbidopa 23.75 mG/levodopa 95 mG ER 1 Capsule(s) Oral <User Schedule>  levothyroxine 125 MICROGram(s) Oral daily  meropenem Injectable 1000 milliGRAM(s) IV Push every 8 hours  metoprolol tartrate 12.5 milliGRAM(s) Oral at bedtime  metoprolol tartrate 25 milliGRAM(s) Oral daily  QUEtiapine 50 milliGRAM(s) Oral at bedtime  sertraline 50 milliGRAM(s) Oral daily  sodium chloride 0.9%. 1000 milliLiter(s) (75 mL/Hr) IV Continuous <Continuous>      LABS: All Labs Reviewed:                        12.1 14.21 )-----------( 247      ( 31 Jul 2024 21:27 )             35.5     07-31    139  |  106  |  22  ----------------------------<  116<H>  3.9   |  25  |  0.72    Ca    9.2      31 Jul 2024 22:11    TPro  7.0  /  Alb  2.9<L>  /  TBili  0.9  /  DBili  x   /  AST  23  /  ALT  12  /  AlkPhos  104  07-31    PT/INR - ( 31 Jul 2024 21:27 )   PT: 14.5 sec;   INR: 1.29 ratio         PTT - ( 31 Jul 2024 21:27 )  PTT:40.9 sec        Blood Culture: Organism --  Gram Stain Blood -- Gram Stain --  Specimen Source .Blood None  Culture-Blood --      EKG: AF IVCD anterolateral T wave changes

## 2024-08-02 NOTE — PHYSICAL THERAPY INITIAL EVALUATION ADULT - CRITERIA FOR SKILLED THERAPEUTIC INTERVENTIONS
pt does not require PT intervention @ this time; currently @ baseline functional status; recommend OOB via [employer] lift on nursing floor care; KENIA Florentino made aware

## 2024-08-02 NOTE — DIETITIAN INITIAL EVALUATION ADULT - ADD RECOMMEND
1. C/w regular diet to maximize caloric and protein intake; consider SLP consult to determine least restrictive/ most tolerated diet consistency   2. Encourage protein-rich foods, maximize food preferences   3. Add ensure plus high protein BID to optimize PO intake (provides 350 kcal, 20g protein/ shake)   4. Consider obtaining vitamin D 25OH level to assess nutriture   5. Monitor bowel movements, if no BM for >3 days, consider implementing bowel regimen.   6. Continue to monitor lytes and replete PRN (especially K+)   7. Consider adding thiamine 100 mg daily 2/2 malnutrition and MVI w/ minerals daily to ensure 100% RDA met   8. Confirm goals of care regarding nutrition support - Nutrition support is not recommended due to overall declining medical status which evidenced based studies indicate EN is not effective in prolonging survival and improving quality of life. It can also increase risk of aspiration pneumonia as well as other related issues (infection, GI complications, and worsening/ non-healing PI's). However, will provide nutrition/ hydration within GOC.   RD will continue to monitor PO intake, labs, hydration, and wt prn.

## 2024-08-02 NOTE — DIETITIAN INITIAL EVALUATION ADULT - ORAL INTAKE PTA/DIET HISTORY
Unable to obtain detailed diet hx 2/2 pt ARNULFO, states she uses hearing aides but did not have them on at time of visit. Reports difficulty w/ eating, requires to be fed and tolerates soft foods only, likely meeting </= 50% ENN.

## 2024-08-03 PROCEDURE — 99232 SBSQ HOSP IP/OBS MODERATE 35: CPT

## 2024-08-03 RX ADMIN — Medication 12.5 MILLIGRAM(S): at 21:34

## 2024-08-03 RX ADMIN — Medication 50 MILLIGRAM(S): at 21:35

## 2024-08-03 RX ADMIN — APIXABAN 2.5 MILLIGRAM(S): 5 TABLET, FILM COATED ORAL at 21:35

## 2024-08-03 RX ADMIN — Medication 650 MILLIGRAM(S): at 19:08

## 2024-08-03 RX ADMIN — Medication 125 MICROGRAM(S): at 06:32

## 2024-08-03 RX ADMIN — MEROPENEM 1000 MILLIGRAM(S): 1 INJECTION, POWDER, FOR SOLUTION INTRAVENOUS at 21:34

## 2024-08-03 RX ADMIN — APIXABAN 2.5 MILLIGRAM(S): 5 TABLET, FILM COATED ORAL at 06:32

## 2024-08-03 RX ADMIN — CARBIDOPA AND LEVODOPA 1 CAPSULE(S): 25; 100 TABLET ORAL at 09:06

## 2024-08-03 RX ADMIN — MEROPENEM 1000 MILLIGRAM(S): 1 INJECTION, POWDER, FOR SOLUTION INTRAVENOUS at 14:47

## 2024-08-03 RX ADMIN — AMLODIPINE BESYLATE 5 MILLIGRAM(S): 2.5 TABLET ORAL at 06:31

## 2024-08-03 RX ADMIN — CARBIDOPA AND LEVODOPA 1 CAPSULE(S): 25; 100 TABLET ORAL at 14:46

## 2024-08-03 RX ADMIN — Medication 50 MILLIGRAM(S): at 18:51

## 2024-08-03 RX ADMIN — Medication 3 MILLIGRAM(S): at 21:35

## 2024-08-03 RX ADMIN — MEROPENEM 1000 MILLIGRAM(S): 1 INJECTION, POWDER, FOR SOLUTION INTRAVENOUS at 06:31

## 2024-08-03 RX ADMIN — Medication 25 MILLIGRAM(S): at 06:32

## 2024-08-03 RX ADMIN — ATORVASTATIN CALCIUM 20 MILLIGRAM(S): 40 TABLET, FILM COATED ORAL at 21:34

## 2024-08-03 RX ADMIN — Medication 50 MILLIGRAM(S): at 18:12

## 2024-08-03 RX ADMIN — SERTRALINE HYDROCHLORIDE 50 MILLIGRAM(S): 100 TABLET, FILM COATED ORAL at 09:06

## 2024-08-03 NOTE — PROGRESS NOTE ADULT - SUBJECTIVE AND OBJECTIVE BOX
Follow Up:      Interval History/ROS: Afebrile overnight. Patient assessed at bedside this morning. Denied any acute complaints.      REVIEW OF SYSTEMS  [  ] ROS unobtainable because:    [ x ] All other systems negative except as noted below    Constitutional:  [ ] fever [ ] chills  [ ] weight loss  [ ]night sweat  [ ]poor appetite/PO intake [ ]fatigue   Skin:  [ ] rash [ ] phlebitis	  Eyes: [ ] icterus [ ] pain  [ ] discharge	  ENMT: [ ] sore throat  [ ] thrush [ ] ulcers [ ] exudates [ ]anosmia  Respiratory: [ ] dyspnea [ ] hemoptysis [ ] cough [ ] sputum	  Cardiovascular:  [ ] chest pain [ ] palpitations [ ] edema	  Gastrointestinal:  [ ] nausea [ ] vomiting [ ] diarrhea [ ] constipation [ ] pain	  Genitourinary:  [ ] dysuria [ ] frequency [ ] hematuria [ ] discharge [ ] flank pain  [ ] incontinence  Musculoskeletal:  [ ] myalgias [ ] arthralgias [ ] arthritis  [ ] back pain  Neurological:  [ ] headache [ ] weakness [ ] seizures  [ ] confusion/altered mental status    Allergies  amoxicillin (Unknown)  contrast (Unknown)        ANTIMICROBIALS:    meropenem Injectable 1000 every 8 hours        OTHER MEDS: MEDICATIONS  (STANDING):  acetaminophen     Tablet .. 650 every 6 hours PRN  acetaminophen     Tablet .. 650 every 6 hours PRN  aluminum hydroxide/magnesium hydroxide/simethicone Suspension 30 every 4 hours PRN  amLODIPine   Tablet 5 daily  apixaban 2.5 every 12 hours  atorvastatin 20 at bedtime  carbidopa 23.75 mG/levodopa 95 mG ER 1 <User Schedule>  clonazePAM  Tablet 1 daily PRN  levothyroxine 125 daily  melatonin 3 at bedtime PRN  metoprolol tartrate 12.5 at bedtime  metoprolol tartrate 25 daily  ondansetron Injectable 4 every 6 hours PRN  ondansetron Injectable 4 every 8 hours PRN  QUEtiapine 50 at bedtime  sertraline 50 daily  traMADol 50 two times a day PRN      Vital Signs Last 24 Hrs  T(F): 98.1 (08-02-24 @ 08:04), Max: 100.4 (08-01-24 @ 07:57)    Vital Signs Last 24 Hrs  HR: 56 (08-02-24 @ 08:04) (55 - 79)  BP: 138/50 (08-02-24 @ 08:04) (116/88 - 138/50)  RR: 18 (08-02-24 @ 06:26)  SpO2: 99% (08-02-24 @ 08:04) (95% - 99%)  Wt(kg): --    EXAM:    Constitutional:  frail, NAD  Head/Eyes: no icterus  LUNGS:  CTA  CVS:  regular rhythm  Abd:  soft, non-tender; non-distended  Ext:  no edema  Vascular:  IV site no erythema tenderness or discharge  Neuro: AAO X 3, non- focal    Labs:                        9.5    5.62  )-----------( 197      ( 02 Aug 2024 07:13 )             29.3     08-02    143  |  113<H>  |  16  ----------------------------<  90  3.4<L>   |  25  |  0.71    Ca    8.6      02 Aug 2024 07:13    TPro  7.0  /  Alb  2.9<L>  /  TBili  0.9  /  DBili  x   /  AST  23  /  ALT  12  /  AlkPhos  104  07-31      WBC Trend:  WBC Count: 5.62 (08-02-24 @ 07:13)  WBC Count: 14.21 (07-31-24 @ 21:27)      Creatine Trend:  Creatinine: 0.71 (08-02)  Creatinine: 0.72 (07-31)      Liver Biochemical Testing Trend:  Alanine Aminotransferase (ALT/SGPT): 12 (07-31)  Aspartate Aminotransferase (AST/SGOT): 23 (07-31-24 @ 22:11)  Bilirubin Total: 0.9 (07-31)      Trend LDH      Urinalysis Basic - ( 02 Aug 2024 07:13 )    Color: x / Appearance: x / SG: x / pH: x  Gluc: 90 mg/dL / Ketone: x  / Bili: x / Urobili: x   Blood: x / Protein: x / Nitrite: x   Leuk Esterase: x / RBC: x / WBC x   Sq Epi: x / Non Sq Epi: x / Bacteria: x        MICROBIOLOGY:        Urinalysis with Rflx Culture (collected 31 Jul 2024 23:00)    Culture - Urine (collected 31 Jul 2024 23:00)  Source: .Urine None  Final Report:    <10,000 CFU/mL Normal Urogenital Jovita    Culture - Blood (collected 31 Jul 2024 21:27)  Source: .Blood None  Preliminary Report:    No growth at 24 hours    Culture - Blood (collected 31 Jul 2024 21:27)  Source: .Blood None  Preliminary Report:    No growth at 24 hours            Lactate, Blood: 1.8 (07-31 @ 21:27)        RADIOLOGY:  imaging below personally reviewed                CT Abdomen and Pelvis w/ IV Cont:   ACC: [id number] EXAM:  CT ABDOMEN AND PELVIS IC   ORDERED BY: LIBBY HARO     PROCEDURE DATE:  07/31/2024          INTERPRETATION:  CLINICAL INFORMATION: Abdominal pain. Persistent urinary   tract infection.    COMPARISON: CT torso 5/14/2022.    CONTRAST/COMPLICATIONS:  IV Contrast: Omnipaque 350  90 cc administered   0 cc discarded  Oral Contrast: NONE  Complications: None reported at time of study completion    PROCEDURE:  CT of the Abdomen and Pelvis was performed.  Sagittal and coronal reformats were performed.    FINDINGS:  LOWER CHEST: Cardiomegaly. Coronary artery calcifications. Trace   bilateral pleural effusions with adjacent bibasilar opacities, likely   representing dependent atelectasis. Recommend clinical correlation to   assess for superimposed infection.    LIVER: Within normal limits.  BILE DUCTS: Normal caliber.  GALLBLADDER: Within normal limits.  SPLEEN: Within normal limits.  PANCREAS: 1.8 cm well defined cystic lesion in the head/neck of the   pancreas (62:3). No significant ductal dilatation. Consider nonemergent   MR.  ADRENALS: Within normal limits.  KIDNEYS/URETERS: No hydronephrosis. Mild heterogeneity of the left   kidney. Too small to characterize bilateral renal hypodensities.    BLADDER: Inadequately distended, limiting detailed evaluation.  REPRODUCTIVE ORGANS: Uterus and adnexa within normal limits.    BOWEL: No bowel obstruction. Normal appendix. Surgical anastomosis   identified at the level of the transverse colon.  PERITONEUM/RETROPERITONEUM: Within normal limits.  VESSELS: Atherosclerotic changes.  LYMPH NODES: No lymphadenopathy.  ABDOMINAL WALL: Partially imaged left thigh subcutaneous edema.  BONES: Degenerative changes. Redemonstrated compression deformities of   thoracolumbar vertebral bodies including but not limited to T12 and L3.    IMPRESSION:    No hydronephrosis. Mild heterogeneity of the left kidney. Correlate with   urinalysis and lab values to assess for pyelonephritis in appropriate   clinical setting.      1.8 cm well defined cystic lesion in the head/neck of the pancreas   (62:3). No significant ductal dilatation. Consider nonemergent MR.    Trace bilateral pleural effusions with adjacent bibasilar opacities,   likely representing dependent atelectasis. Recommend clinical correlation   to assess for superimposed infection. Consider chest radiographic imaging   follow-up.    --- End of Report ---            DEVYN ARSHAD MD; Attending Radiologist  This document has been electronically signed. Aug  1 2024 12:35AM (07-31-24 @ 23:52)      
Patient is a 89-year-old female PMH Parkinson's disease, ICH, A-fib, presents to the emergency department for UTI.  Patient reportedly started having urinary symptoms around 7/4, has completed 2 courses of antibiotics without improvement in her symptoms.  Sent in by primary care doctor due to resistant UTI.  Family states they believe she has had drug-resistant UTIs in the past, has required hospitalization at least once.  They deny fever, chills, nausea, vomiting, or diarrhea. Family also notes the patient's been a little bit more disoriented from her baseline.    8.2: alert, follows commands  8.3: somnolent, easy to arouset, no change in status, no distres          REVIEW OF SYSTEMS:    CONSTITUTIONAL: No weakness, No fevers or chills  ENT: No ear ache, No sorethroat  NECK: No pain, No stiffness  RESPIRATORY: No cough, No wheezing, No hemoptysis; No dyspnea  CARDIOVASCULAR: No chest pain, No palpitations  GASTROINTESTINAL: No abd pain, No nausea, No vomiting, No hematemesis, No diarrhea or constipation. No melena, No hematochezia.  GENITOURINARY: No dysuria, No  hematuria  NEUROLOGICAL: No diplopia, No paresthesia, No motor dysfunction  MUSCULOSKELETAL: No arthralgia, No myalgia  SKIN: No rashes, or lesions   PSYCH: no anxiety, no suicidal ideation    All other review of systems is negative unless indicated above    Vital Signs Last 24 Hrs  T(C): 36.6 (03 Aug 2024 08:05), Max: 36.7 (02 Aug 2024 15:53)  T(F): 97.9 (03 Aug 2024 08:05), Max: 98.1 (02 Aug 2024 15:53)  HR: 69 (03 Aug 2024 08:05) (61 - 80)  BP: 133/69 (03 Aug 2024 08:05) (128/83 - 149/77)  BP(mean): 48 (03 Aug 2024 08:05) (48 - 48)  RR: 18 (03 Aug 2024 08:12) (18 - 18)  SpO2: 96% (03 Aug 2024 08:12) (88% - 98%)    Parameters below as of 03 Aug 2024 08:12  Patient On (Oxygen Delivery Method): nasal cannula  O2 Flow (L/min): 2      PHYSICAL EXAM:    GENERAL: NAD  HEENT:  NC/AT, EOMI, PERRLA, No scleral icterus, Moist mucous membranes  NECK: Supple, No JVD  CNS:  Alert & Oriented X3, Motor Strength 5/5 B/L upper and lower extremities; DTRs 2+ intact   LUNG: Normal Breath sounds, Clear to auscultation bilaterally, No rales, No rhonchi, No wheezing  HEART: RRR; No murmurs, No rubs  ABDOMEN: +BS, ST/ND/NT  GENITOURINARY: Voiding, Bladder not distended  EXTREMITIES:  2+ Peripheral Pulses, No clubbing, No cyanosis, No tibial edema  MUSCULOSKELTAL: Joints normal ROM, No TTP, No effusion  VAGINAL: deferred  SKIN: no rashes  RECTAL: deferred, not indicated  BREAST: deferred                          9.5    5.62  )-----------( 197      ( 02 Aug 2024 07:13 )             29.3     08-02    143  |  113<H>  |  16  ----------------------------<  90  3.4<L>   |  25  |  0.71    Ca    8.6      02 Aug 2024 07:13    TPro  7.0  /  Alb  2.9<L>  /  TBili  0.9  /  DBili  x   /  AST  23  /  ALT  12  /  AlkPhos  104  07-31    Vancomycin levels:   Cultures:     MEDICATIONS  (STANDING):  amLODIPine   Tablet 5 milliGRAM(s) Oral daily  apixaban 2.5 milliGRAM(s) Oral every 12 hours  atorvastatin 20 milliGRAM(s) Oral at bedtime  carbidopa 23.75 mG/levodopa 95 mG ER 1 Capsule(s) Oral <User Schedule>  levothyroxine 125 MICROGram(s) Oral daily  meropenem Injectable 1000 milliGRAM(s) IV Push every 8 hours  metoprolol tartrate 25 milliGRAM(s) Oral daily  metoprolol tartrate 12.5 milliGRAM(s) Oral at bedtime  QUEtiapine 50 milliGRAM(s) Oral at bedtime  sertraline 50 milliGRAM(s) Oral daily  sodium chloride 0.9%. 1000 milliLiter(s) (75 mL/Hr) IV Continuous <Continuous>    MEDICATIONS  (PRN):  acetaminophen     Tablet .. 650 milliGRAM(s) Oral every 6 hours PRN Temp greater or equal to 38C (100.4F), Mild Pain (1 - 3)  acetaminophen     Tablet .. 650 milliGRAM(s) Oral every 6 hours PRN Mild Pain (1 - 3)  aluminum hydroxide/magnesium hydroxide/simethicone Suspension 30 milliLiter(s) Oral every 4 hours PRN Dyspepsia  clonazePAM  Tablet 1 milliGRAM(s) Oral daily PRN for anxiety  melatonin 3 milliGRAM(s) Oral at bedtime PRN Insomnia  ondansetron Injectable 4 milliGRAM(s) IV Push every 6 hours PRN Nausea and/or Vomiting  ondansetron Injectable 4 milliGRAM(s) IV Push every 8 hours PRN Nausea and/or Vomiting  traMADol 50 milliGRAM(s) Oral two times a day PRN for severe pain      all labs reviewed  all imaging reviewed          Problem/Plan - 1:  Urinary tract infection.   ·  Plan: # Pyelonephritis  # Hx of ESBL Organisms  change to Meropenem per ID, complete 3days of Abx    Problem/Plan - 2:  ·  Problem: Acute metabolic encephalopathy.   ·  Plan: - this is secondary to combination of advanced age /  UTI  mentation at baseline     Problem/Plan - 3:  ·  Problem: Afib.   ·  Plan: - takes ELIQUIS 2.5 mg q12h.    Problem/Plan - 4:  ·  Problem: Parkinson's disease.   ·  Plan: - continue  with home regiment of meds.    Hypokalemia 
{\rtf1\ngvukr24787\ansi\osfbzuk1564\ftnbj\uc1\deff0  {\fonttbl{\f0 \fnil Segoe UI;}{\f1 \fnil \fcharset0 Segoe UI;}{\f2 \fnil Times New Juvenal;}}  {\colortbl ;\wag492\vutdp574\arst284 ;\red0\green0\blue0 ;\red0\green0\extu242 ;\red0\green0\blue0 ;}  {\stylesheet{\f0\fs20 Normal;}{\cs1 Default Paragraph Font;}{\cs2\f0\fs16 Line Number;}{\cs3\f2\fs24\ul\cf3 Hyperlink;}}  {\*\revtbl{Unknown;}}  \rqmjhm88454\urtpzb38464\gpdgn1527\kafqw5885\ejlgs9417\iomhv4829\kwbkfhg859\mjacvhh173\nogrowautofit\cbnsrt595\formshade\nofeaturethrottle1\dntblnsbdb\fet4\aendnotes\aftnnrlc\pgbrdrhead\pgbrdrfoot  \sectd\popraf62891\likegm09681\guttersxn0\zzcomcca8245\ztyyiyil8514\iamldmgy8148\fphppdnz6516\onjcgxu828\enhityn646\sbkpage\pgncont\pgndec  \plain\plain\f0\fs24\ql\plain\f0\fs24\plain\f0\fs20\duzn3236\hich\f0\dbch\f0\loch\f0\fs20 Patient is a 89-year-old female PMH Parkinson's disease, ICH, A-fib, presents to the emergency department for UTI.  Patient reportedly started having urinary symptoms   around 7/4, has completed 2 courses of antibiotics without improvement in her symptoms.  Sent in by primary care doctor due to resistant UTI.  Family states they believe she has had drug-resistant UTIs in the past, has required hospitalization at least   once.  They deny fever, chills, nausea, vomiting, or diarrhea. Family also notes the patient's been a little bit more disoriented from her baseline.\par  \par  8.2: alert, follows commands\par  \par  \pard\plain\f0\fs24\plain\f0\fs20\yvpu5302\hich\f0\dbch\f0\loch\f0\fs20\par  \par  \par  \par  REVIEW OF SYSTEMS:\par  \par  CONSTITUTIONAL: No weakness, No fevers or chills\par  ENT: No ear ache, No sorethroat\par  NECK: No pain, No stiffness\par  RESPIRATORY: No cough, No wheezing, No hemoptysis; No dyspnea\par  CARDIOVASCULAR: No chest pain, No palpitations\par  GASTROINTESTINAL: No abd pain, No nausea, No vomiting, No hematemesis, No diarrhea or constipation. No melena, No hematochezia.\par  GENITOURINARY: No dysuria, No  hematuria\par  NEUROLOGICAL: No diplopia, No paresthesia, No motor dysfunction\par  MUSCULOSKELETAL: No arthralgia, No myalgia\par  SKIN: No rashes, or lesions \par  PSYCH: no anxiety, no suicidal ideation\par  \par  All other review of systems is negative unless indicated above\par  \par  Vital Signs Last 24 Hrs\par  T(C): 36.7 (02 Aug 2024 15:53), Max: 36.8 (02 Aug 2024 06:26)\par  T(F): 98.1 (02 Aug 2024 15:53), Max: 98.2 (02 Aug 2024 06:26)\par  HR: 61 (02 Aug 2024 15:53) (55 - 79)\par  BP: 128/83 (02 Aug 2024 15:53) (118/48 - 138/50)\par  BP(mean): 68 (02 Aug 2024 06:26) (68 - 68)\par  RR: 18 (02 Aug 2024 06:26) (18 - 18)\par  SpO2: 98% (02 Aug 2024 15:53) (98% - 99%)\par  \par  Parameters below as of 02 Aug 2024 15:53\par  Patient On (Oxygen Delivery Method): nasal cannula\par  O2 Flow (L/min): 2\par  \par  \par  PHYSICAL EXAM:\par  \par  GENERAL: NAD\par  HEENT:  NC/AT, EOMI, PERRLA, No scleral icterus, Moist mucous membranes\par  NECK: Supple, No JVD\par  CNS:  Alert & Oriented X3, Motor Strength 5/5 B/L upper and lower extremities; DTRs 2+ intact \par  LUNG: Normal Breath sounds, Clear to auscultation bilaterally, No rales, No rhonchi, No wheezing\par  HEART: RRR; No murmurs, No rubs\par  ABDOMEN: +BS, ST/ND/NT\par  GENITOURINARY: Voiding, Bladder not distended\par  EXTREMITIES:  2+ Peripheral Pulses, No clubbing, No cyanosis, No tibial edema\par  MUSCULOSKELTAL: Joints normal ROM, No TTP, No effusion\par  VAGINAL: deferred\par  SKIN: no rashes\par  RECTAL: deferred, not indicated\par  BREAST: deferred\par  \par           \par             9.5  \par  5.62  )-----------( 197      ( 02 Aug 2024 07:13 )\par             29.3 \par  \par  08-02\par  \par  143  |  113<H>  |  16\par  ----------------------------<  90\par  3.4<L>   |  25  |  0.71\par  \par  Ca    8.6      02 Aug 2024 07:13\par  \par  TPro  7.0  /  Alb  2.9<L>  /  TBili  0.9  /  DBili  x   /  AST  23  /  ALT  12  /  AlkPhos  104  07-31\par  \par  Vancomycin levels: \par  Cultures: \par  \par  MEDICATIONS  (STANDING):\par  amLODIPine   Tablet 5 milliGRAM(s) Oral daily\par  apixaban 2.5 milliGRAM(s) Oral every 12 hours\par  atorvastatin 20 milliGRAM(s) Oral at bedtime\par  carbidopa 23.75 mG/levodopa 95 mG ER 1 Capsule(s) Oral <User Schedule>\par  levothyroxine 125 MICROGram(s) Oral daily\par  meropenem Injectable 1000 milliGRAM(s) IV Push every 8 hours\par  metoprolol tartrate 25 milliGRAM(s) Oral daily\par  metoprolol tartrate 12.5 milliGRAM(s) Oral at bedtime\par  QUEtiapine 50 milliGRAM(s) Oral at bedtime\par  sertraline 50 milliGRAM(s) Oral daily\par  sodium chloride 0.9%. 1000 milliLiter(s) (75 mL/Hr) IV Continuous <Continuous>\par  \par  MEDICATIONS  (PRN):\par  acetaminophen     Tablet .. 650 milliGRAM(s) Oral every 6 hours PRN Temp greater or equal to 38C (100.4F), Mild Pain (1 - 3)\par  acetaminophen     Tablet .. 650 milliGRAM(s) Oral every 6 hours PRN Mild Pain (1 - 3)\par  aluminum hydroxide/magnesium hydroxide/simethicone Suspension 30 milliLiter(s) Oral every 4 hours PRN Dyspepsia\par  clonazePAM  Tablet 1 milliGRAM(s) Oral daily PRN for anxiety\par  melatonin 3 milliGRAM(s) Oral at bedtime PRN Insomnia\par  ondansetron Injectable 4 milliGRAM(s) IV Push every 6 hours PRN Nausea and/or Vomiting\par  ondansetron Injectable 4 milliGRAM(s) IV Push every 8 hours PRN Nausea and/or Vomiting\par  traMADol 50 milliGRAM(s) Oral two times a day PRN for severe pain\par  \par  \par  all labs reviewed\par  all imaging reviewed\par  \par  \ql\plain\f0\fs24\plain\f0\fs20\jwgl7299\hich\f0\dbch\f0\loch\f0\fs20\par  \par  \par  \plain\f1\fs20\grhy1098\hich\f1\dbch\f1\loch\f1\cf2\fs20\b\ul{\field{\*\fldinst HYPERLINK [id number] }{\fldrslt Problem/Plan - 1:}}\plain\f0\fs20\ybhw3295\hich\f0\dbch\f0\loch\f0\fs20\ql\par  {\*\bkmkstart [id number]}{\*\bkmkend bo11186804624}{\*\bkmkstart dr12362264034}{\*\bkmkend rb84199650857}Urinary tract infection. \par  \'b7  {\*\bkmkstart [id number]}{\*\bkmkend oh89526912121}Plan: {\*\bkmkstart zt94315286433}{\*\bkmkend [id number]}# Pyelonephritis\par  # Hx of ESBL Organisms\par  \plain\f1\fs20\pggm0613\hich\f1\dbch\f1\loch\f1\cf2\fs20\strike\plain\f0\fs20\lpxg5371\hich\f0\dbch\f0\loch\f0\fs20 change to Meropenem per ID\par  \par  \plain\f1\fs20\eiev0102\hich\f1\dbch\f1\loch\f1\cf2\fs20\b\ul{\field{\*\fldinst HYPERLINK [id number] }{\fldrslt Problem/Plan - 2:}}\plain\f0\fs20\rgda1075\hich\f0\dbch\f0\loch\f0\fs20\ql\par  \'b7  {\*\bkmkstart [id number]}{\*\bkmkend rx74188696770}Problem: {\*\bkmkstart ny15413246262}{\*\bkmkend vf77200549133}Acute metabolic encephalopathy. \par  \'b7  {\*\bkmkstart [id number]}{\*\bkmkend ib59392855670}Plan: {\*\bkmkstart pq09021502382}{\*\bkmkend eo43364833659}- this is secondary to combination of advanced age /  UTI\par  - continue with hydration.\par  \par  \plain\f1\fs20\twod5030\hich\f1\dbch\f1\loch\f1\cf2\fs20\b\ul{\field{\*\fldinst HYPERLINK [id number] }{\fldrslt Problem/Plan - 3:}}\plain\f0\fs20\mnol2416\hich\f0\dbch\f0\loch\f0\fs20\ql\par  \'b7  {\*\bkmkstart mp68377504579}{\*\bkmkend et17302184943}Problem: {\*\bkmkstart qj32602346906}{\*\bkmkend ly27103398273}Afib. \par  \'b7  {\*\bkmkstart bk28376155123}{\*\bkmkend va12331616285}Plan: {\*\bkmkstart [id number]}{\*\bkmkend fl76622548289}- takes ELIQUIS 2.5 mg q12h.\par  \par  \plain\f1\fs20\wfvm6524\hich\f1\dbch\f1\loch\f1\cf2\fs20\b\ul{\field{\*\fldinst HYPERLINK [id number] }{\fldrslt Problem/Plan - 4:}}\plain\f0\fs20\xtpp1034\hich\f0\dbch\f0\loch\f0\fs20\ql\par  \'b7  {\*\bkmkstart [id number]}{\*\bkmkend sl14982220524}Problem: {\*\bkmkstart yu39046780363}{\*\bkmkend kc36197288016}Parkinson's disease. \par  \'b7  {\*\bkmkstart tj95334102726}{\*\bkmkend yl87868792977}Plan: {\*\bkmkstart nc87693114832}{\*\bkmkend qx30021003054}- continue  with home regiment of meds.\par  \par  Hypokalemia \par  }

## 2024-08-04 VITALS
SYSTOLIC BLOOD PRESSURE: 132 MMHG | RESPIRATION RATE: 18 BRPM | TEMPERATURE: 98 F | OXYGEN SATURATION: 92 % | DIASTOLIC BLOOD PRESSURE: 57 MMHG | HEART RATE: 66 BPM

## 2024-08-04 PROCEDURE — 99239 HOSP IP/OBS DSCHRG MGMT >30: CPT

## 2024-08-04 RX ORDER — SULFAMETHOXAZOLE AND TRIMETHOPRIM 400; 80 MG/1; MG/1
1 TABLET ORAL
Qty: 14 | Refills: 0
Start: 2024-08-04

## 2024-08-04 RX ORDER — AMLODIPINE BESYLATE 2.5 MG/1
1 TABLET ORAL
Refills: 0 | DISCHARGE

## 2024-08-04 RX ORDER — AMLODIPINE BESYLATE 2.5 MG/1
1 TABLET ORAL
Qty: 30 | Refills: 0
Start: 2024-08-04

## 2024-08-04 RX ORDER — AMLODIPINE BESYLATE 2.5 MG/1
5 TABLET ORAL ONCE
Refills: 0 | Status: COMPLETED | OUTPATIENT
Start: 2024-08-04 | End: 2024-08-04

## 2024-08-04 RX ADMIN — Medication 25 MILLIGRAM(S): at 10:22

## 2024-08-04 RX ADMIN — MEROPENEM 1000 MILLIGRAM(S): 1 INJECTION, POWDER, FOR SOLUTION INTRAVENOUS at 06:12

## 2024-08-04 RX ADMIN — APIXABAN 2.5 MILLIGRAM(S): 5 TABLET, FILM COATED ORAL at 10:22

## 2024-08-04 RX ADMIN — SERTRALINE HYDROCHLORIDE 50 MILLIGRAM(S): 100 TABLET, FILM COATED ORAL at 10:22

## 2024-08-04 RX ADMIN — AMLODIPINE BESYLATE 5 MILLIGRAM(S): 2.5 TABLET ORAL at 10:22

## 2024-08-04 RX ADMIN — AMLODIPINE BESYLATE 5 MILLIGRAM(S): 2.5 TABLET ORAL at 14:26

## 2024-08-04 RX ADMIN — Medication 125 MICROGRAM(S): at 06:13

## 2024-08-04 RX ADMIN — CARBIDOPA AND LEVODOPA 1 CAPSULE(S): 25; 100 TABLET ORAL at 07:58

## 2024-08-04 NOTE — DISCHARGE NOTE PROVIDER - DETAILS OF MALNUTRITION DIAGNOSIS/DIAGNOSES
This patient has been assessed with a concern for Malnutrition and was treated during this hospitalization for the following Nutrition diagnosis/diagnoses:     -  08/02/2024: Moderate protein-calorie malnutrition

## 2024-08-04 NOTE — DISCHARGE NOTE PROVIDER - NSDCMRMEDTOKEN_GEN_ALL_CORE_FT
amLODIPine 5 mg oral tablet: 1 tab(s) orally once a day  apixaban 2.5 mg oral tablet: 1 tab(s) orally 2 times a day  clonazePAM 1 mg oral tablet: 1 tab(s) orally once a day as needed for  anxiety  furosemide 20 mg oral tablet: 1 tab(s) orally once a day as needed for leg swelling  levothyroxine 125 mcg (0.125 mg) oral tablet: 1 tab(s) orally once a day  Lipitor 20 mg oral tablet: 1 tab(s) orally once a day (at bedtime)  metoprolol tartrate 25 mg oral tablet: 0.5 tab(s) orally once a day (at bedtime)  metoprolol tartrate 25 mg oral tablet: 1 tab(s) orally once a day (in the morning) ***25 mg AM and 12.5 mg PM***  QUEtiapine 50 mg oral tablet: 1 tab(s) orally once a day (at bedtime)  Rytary 23.75 mg-95 mg oral capsule, extended release: 1 cap(s) orally 2 times a day @ 8AM, @ 3PM  sertraline 50 mg oral tablet: 1 tab(s) orally once a day  sulfamethoxazole-trimethoprim 400 mg-80 mg oral tablet: 1 tab(s) orally 2 times a day  traMADol 50 mg oral tablet: 1 tab(s) orally 2 times a day as needed for  severe pain

## 2024-08-04 NOTE — DISCHARGE NOTE NURSING/CASE MANAGEMENT/SOCIAL WORK - NSDCPEFALRISK_GEN_ALL_CORE
For information on Fall & Injury Prevention, visit: [email or web address] OR  [email or web address] OR  https://www.cdc.gov/steadi/patient.html

## 2024-08-04 NOTE — DISCHARGE NOTE PROVIDER - POSTFACE STATEMENT FOR MINUTES SPENT
[address]  coding opportunities       Chart reviewed, no opportunity found: CHART REVIEWED, NO OPPORTUNITY FOUND        Patients Insurance     Medicare Insurance: Medicare
minutes on the discharge service.

## 2024-08-04 NOTE — DISCHARGE NOTE PROVIDER - HOSPITAL COURSE
Patient is a 89-year-old female PMH Parkinson's disease, ICH, A-fib, presents to the emergency department for UTI.  Patient reportedly started having urinary symptoms around 7/4, has completed 2 courses of antibiotics without improvement in her symptoms.  Sent in by primary care doctor due to resistant UTI.  Family states they believe she has had drug-resistant UTIs in the past, has required hospitalization at least once.  They deny fever, chills, nausea, vomiting, or diarrhea. Family also notes the patient's been a little bit more disoriented from her baseline.    8.2: alert, follows commands  8.3: somnolent, easy to arouset, no change in status, no distres        REVIEW OF SYSTEMS:    CONSTITUTIONAL: No weakness, No fevers or chills  ENT: No ear ache, No sorethroat  NECK: No pain, No stiffness  RESPIRATORY: No cough, No wheezing, No hemoptysis; No dyspnea  CARDIOVASCULAR: No chest pain, No palpitations  GASTROINTESTINAL: No abd pain, No nausea, No vomiting, No hematemesis, No diarrhea or constipation. No melena, No hematochezia.  GENITOURINARY: No dysuria, No  hematuria  NEUROLOGICAL: No diplopia, No paresthesia, No motor dysfunction  MUSCULOSKELETAL: No arthralgia, No myalgia  SKIN: No rashes, or lesions   PSYCH: no anxiety, no suicidal ideation    All other review of systems is negative unless indicated above    Vital Signs Last 24 Hrs  T(C): 36.6 (03 Aug 2024 08:05), Max: 36.7 (02 Aug 2024 15:53)  T(F): 97.9 (03 Aug 2024 08:05), Max: 98.1 (02 Aug 2024 15:53)  HR: 69 (03 Aug 2024 08:05) (61 - 80)  BP: 133/69 (03 Aug 2024 08:05) (128/83 - 149/77)  BP(mean): 48 (03 Aug 2024 08:05) (48 - 48)  RR: 18 (03 Aug 2024 08:12) (18 - 18)  SpO2: 96% (03 Aug 2024 08:12) (88% - 98%)    Parameters below as of 03 Aug 2024 08:12  Patient On (Oxygen Delivery Method): nasal cannula  O2 Flow (L/min): 2      PHYSICAL EXAM:    GENERAL: NAD  HEENT:  NC/AT, EOMI, PERRLA, No scleral icterus, Moist mucous membranes  NECK: Supple, No JVD  CNS:  Alert & Oriented X3, Motor Strength 5/5 B/L upper and lower extremities; DTRs 2+ intact   LUNG: Normal Breath sounds, Clear to auscultation bilaterally, No rales, No rhonchi, No wheezing  HEART: RRR; No murmurs, No rubs  ABDOMEN: +BS, ST/ND/NT  GENITOURINARY: Voiding, Bladder not distended  EXTREMITIES:  2+ Peripheral Pulses, No clubbing, No cyanosis, No tibial edema  MUSCULOSKELTAL: Joints normal ROM, No TTP, No effusion  VAGINAL: deferred  SKIN: no rashes  RECTAL: deferred, not indicated  BREAST: deferred    a/p:    Problem/Plan - 1:  Urinary tract infection.   # Hx of ESBL Organisms  completed 3days of Meropenem   ID eval noted  CT Abd noted: ? L renal stranding, mild suspicion for pyelonephritis ....will complete another 7d of bactrim at home   UCx no growth as she was treated with Abx prior to admission     Problem/Plan - 2:  ·  Problem: Acute metabolic encephalopathy.   ·  Plan: - this is secondary to combination of advanced age /  UTI  mentation returned to  baseline     Problem/Plan - 3:  ·  Problem: Afib.   ·  Plan: c/w  ELIQUIS 2.5 mg q12h.    Problem/Plan - 4:  ·  Problem: Parkinson's disease.   ·  Plan: - continue  with home regiment of meds.    Hypokalemia : replenished     Mild pleural effusions: c/w oral Lasix at home

## 2024-08-04 NOTE — DISCHARGE NOTE PROVIDER - NSDCCPCAREPLAN_GEN_ALL_CORE_FT
PRINCIPAL DISCHARGE DIAGNOSIS  Diagnosis: Urinary tract infection  Assessment and Plan of Treatment: complete antibiotic at home

## 2024-08-04 NOTE — DISCHARGE NOTE NURSING/CASE MANAGEMENT/SOCIAL WORK - PATIENT PORTAL LINK FT
You can access the FollowMyHealth Patient Portal offered by [facility] by registering at the following website: [email or web address]. By joining [employer]’s FollowMyHealth portal, you will also be able to view your health information using other applications (apps) compatible with our system.

## 2024-08-06 LAB
CULTURE RESULTS: SIGNIFICANT CHANGE UP
CULTURE RESULTS: SIGNIFICANT CHANGE UP
SPECIMEN SOURCE: SIGNIFICANT CHANGE UP
SPECIMEN SOURCE: SIGNIFICANT CHANGE UP

## 2024-08-15 NOTE — CDI QUERY NOTE - NSCDIOTHERTXTBX_GEN_ALL_CORE_HH
Clinical documentation indicates that this patient has atrial fibrillation.  Please further specify:    Chronic: persistent  Paroxysmal:   Permanent:    Persistent:   Other:  Unknown:    SUPPORTING DOCUMENTATION AND/OR CLINICAL EVIDENCE:    Documentation in HIE of paroxysmal atrial fibrillation on 5/14/2022    Cardiology documentation on 8/2/2024:    AF- HR controlled on metoprolol-- continue at current dose, continue eliquis for AC     EKG:  < from: 12 Lead ECG (08.01.24 @ 00:33) >  Diagnosis Line Repeat tracing without artifact  Undetermined rhythm  Right bundle branch block  Confirmed by DEREK ROMERO ([id number]) on 8/1/2024 4:37:46 PM

## 2025-03-18 NOTE — PROGRESS NOTE ADULT - GASTROINTESTINAL
Soft, non-tender, no hepatosplenomegaly, normal bowel sounds Include Z78.9 (Other Specified Conditions Influencing Health Status) As An Associated Diagnosis?: No Show Aperture Variable?: Yes Medical Necessity Clause: This procedure was medically necessary because the lesions that were treated were: Spray Paint Text: The liquid nitrogen was applied to the skin utilizing a spray paint frosting technique. Post-Care Instructions: I reviewed with the patient in detail post-care instructions. Patient is to wear sunprotection, and avoid picking at any of the treated lesions. Pt may apply Vaseline to crusted or scabbing areas. Consent: The patient's consent was obtained including but not limited to risks of crusting, scabbing, blistering, scarring, darker or lighter pigmentary change, recurrence, incomplete removal and infection. Detail Level: Detailed Medical Necessity Information: It is in your best interest to select a reason for this procedure from the list below. All of these items fulfill various CMS LCD requirements except the new and changing color options.

## 2025-05-19 NOTE — ED ADULT NURSE NOTE - PRIMARY CARE PROVIDER
[No Acute Distress] : no acute distress [Low Lying Soft Palate] : low lying soft palate [III] : Mallampati Class: III [Normal Appearance] : normal appearance [No Neck Mass] : no neck mass [Normal Rate/Rhythm] : normal rate/rhythm [Normal S1, S2] : normal s1, s2 [No Murmurs] : no murmurs [No Resp Distress] : no resp distress [No Acc Muscle Use] : no acc muscle use [Normal Palpation] : normal palpation [Normal Rhythm and Effort] : normal rhythm and effort [Clear to Auscultation Bilaterally] : clear to auscultation bilaterally [No Abnormalities] : no abnormalities [Benign] : benign [Soft] : soft [No HSM] : no hsm [Normal Bowel Sounds] : normal bowel sounds [Normal Gait] : normal gait [No Clubbing] : no clubbing [No Cyanosis] : no cyanosis [No Edema] : no edema [FROM] : FROM [Normal Color/ Pigmentation] : normal color/ pigmentation [No Focal Deficits] : no focal deficits [Oriented x3] : oriented x3 [Normal Affect] : normal affect [TextBox_[id number]] : Right hand 4 th digit r/o RA nodule vs cyst md
